# Patient Record
Sex: FEMALE | Race: WHITE | NOT HISPANIC OR LATINO | Employment: FULL TIME | URBAN - METROPOLITAN AREA
[De-identification: names, ages, dates, MRNs, and addresses within clinical notes are randomized per-mention and may not be internally consistent; named-entity substitution may affect disease eponyms.]

---

## 2017-01-24 ENCOUNTER — PREP FOR SURGERY (OUTPATIENT)
Dept: CARDIOLOGY | Facility: CLINIC | Age: 32
End: 2017-01-24

## 2017-01-24 DIAGNOSIS — I47.1 SVT (SUPRAVENTRICULAR TACHYCARDIA) (HCC): Primary | ICD-10-CM

## 2017-01-24 RX ORDER — ONDANSETRON 2 MG/ML
4 INJECTION INTRAMUSCULAR; INTRAVENOUS EVERY 6 HOURS PRN
Status: CANCELLED | OUTPATIENT
Start: 2017-01-24

## 2017-01-24 RX ORDER — OXYCODONE HYDROCHLORIDE AND ACETAMINOPHEN 5; 325 MG/1; MG/1
1 TABLET ORAL EVERY 4 HOURS PRN
Status: CANCELLED | OUTPATIENT
Start: 2017-01-24 | End: 2017-02-03

## 2017-01-24 RX ORDER — ALPRAZOLAM 0.25 MG/1
0.25 TABLET ORAL EVERY 6 HOURS PRN
Status: CANCELLED | OUTPATIENT
Start: 2017-01-24 | End: 2017-02-03

## 2017-01-24 RX ORDER — NITROGLYCERIN 0.4 MG/1
0.4 TABLET SUBLINGUAL
Status: CANCELLED | OUTPATIENT
Start: 2017-01-24

## 2017-01-24 RX ORDER — ACETAMINOPHEN 325 MG/1
650 TABLET ORAL EVERY 4 HOURS PRN
Status: CANCELLED | OUTPATIENT
Start: 2017-01-24

## 2017-02-01 ENCOUNTER — APPOINTMENT (OUTPATIENT)
Dept: PREADMISSION TESTING | Facility: HOSPITAL | Age: 32
End: 2017-02-01

## 2017-02-01 DIAGNOSIS — I47.1 SVT (SUPRAVENTRICULAR TACHYCARDIA) (HCC): ICD-10-CM

## 2017-02-01 LAB
ANION GAP SERPL CALCULATED.3IONS-SCNC: 4 MMOL/L (ref 3–11)
BUN BLD-MCNC: 16 MG/DL (ref 9–23)
BUN/CREAT SERPL: 26.7 (ref 7–25)
CALCIUM SPEC-SCNC: 9.8 MG/DL (ref 8.7–10.4)
CHLORIDE SERPL-SCNC: 104 MMOL/L (ref 99–109)
CO2 SERPL-SCNC: 31 MMOL/L (ref 20–31)
CREAT BLD-MCNC: 0.6 MG/DL (ref 0.6–1.3)
DEPRECATED RDW RBC AUTO: 40.2 FL (ref 37–54)
ERYTHROCYTE [DISTWIDTH] IN BLOOD BY AUTOMATED COUNT: 12.5 % (ref 11.3–14.5)
GFR SERPL CREATININE-BSD FRML MDRD: 117 ML/MIN/1.73
GLUCOSE BLD-MCNC: 113 MG/DL (ref 70–100)
HCG INTACT+B SERPL-ACNC: <5 MIU/ML
HCT VFR BLD AUTO: 40.3 % (ref 34.5–44)
HGB BLD-MCNC: 13.9 G/DL (ref 11.5–15.5)
INR PPP: 0.96
MAGNESIUM SERPL-MCNC: 1.9 MG/DL (ref 1.3–2.7)
MCH RBC QN AUTO: 30.2 PG (ref 27–31)
MCHC RBC AUTO-ENTMCNC: 34.5 G/DL (ref 32–36)
MCV RBC AUTO: 87.4 FL (ref 80–99)
PLATELET # BLD AUTO: 248 10*3/MM3 (ref 150–450)
PMV BLD AUTO: 9.2 FL (ref 6–12)
POTASSIUM BLD-SCNC: 4.1 MMOL/L (ref 3.5–5.5)
PROTHROMBIN TIME: 10.5 SECONDS (ref 9.6–11.5)
RBC # BLD AUTO: 4.61 10*6/MM3 (ref 3.89–5.14)
SODIUM BLD-SCNC: 139 MMOL/L (ref 132–146)
WBC NRBC COR # BLD: 6.03 10*3/MM3 (ref 3.5–10.8)

## 2017-02-01 RX ORDER — ASCORBIC ACID 500 MG
500 TABLET ORAL DAILY
COMMUNITY
End: 2017-05-16

## 2017-02-01 NOTE — DISCHARGE INSTRUCTIONS
The following instructions given during Pre Admission Testing visit:    Do not eat or drink anything after MN except for sips of water with your a.m. Prescription meds unless otherwise instructed by your physician.    Glasses and jewelry may be worn, but dentures must be removed prior to cath/procedure.    Leave any items you consider valuable at home.    Family members may wait in [facility] waiting area during procedure.    Bring all medications in their original containers the day of procedure.    Bring photo ID and insurance cards on the day of procedure.    Need to make arrangements for transportation prior to discharge.    The following handouts were given:     Heart Cath pathway (if applicable)   Cardiac Cath booklet published by [facility]    OR appropriate [facility] procedure booklet    If applicable, pt instructed to bring CPAP mask and tubing the day of procedure.

## 2017-02-02 ENCOUNTER — HOSPITAL ENCOUNTER (OUTPATIENT)
Facility: HOSPITAL | Age: 32
Setting detail: OBSERVATION
Discharge: HOME | End: 2017-02-03
Attending: INTERNAL MEDICINE | Admitting: INTERNAL MEDICINE

## 2017-02-02 DIAGNOSIS — I47.1 SVT (SUPRAVENTRICULAR TACHYCARDIA) (HCC): ICD-10-CM

## 2017-02-02 PROBLEM — I47.10 SVT (SUPRAVENTRICULAR TACHYCARDIA): Status: ACTIVE | Noted: 2017-02-02

## 2017-02-02 PROCEDURE — 93653 COMPRE EP EVAL TX SVT: CPT | Performed by: INTERNAL MEDICINE

## 2017-02-02 PROCEDURE — 25010000002 DIPHENHYDRAMINE PER 50 MG: Performed by: INTERNAL MEDICINE

## 2017-02-02 PROCEDURE — 93613 INTRACARDIAC EPHYS 3D MAPG: CPT | Performed by: INTERNAL MEDICINE

## 2017-02-02 PROCEDURE — C1732 CATH, EP, DIAG/ABL, 3D/VECT: HCPCS | Performed by: INTERNAL MEDICINE

## 2017-02-02 PROCEDURE — G0378 HOSPITAL OBSERVATION PER HR: HCPCS

## 2017-02-02 PROCEDURE — C1730 CATH, EP, 19 OR FEW ELECT: HCPCS | Performed by: INTERNAL MEDICINE

## 2017-02-02 PROCEDURE — 25010000002 MIDAZOLAM PER 1 MG: Performed by: INTERNAL MEDICINE

## 2017-02-02 PROCEDURE — 25010000002 FENTANYL CITRATE (PF) 100 MCG/2ML SOLUTION: Performed by: INTERNAL MEDICINE

## 2017-02-02 PROCEDURE — 93623 PRGRMD STIMJ&PACG IV RX NFS: CPT | Performed by: INTERNAL MEDICINE

## 2017-02-02 PROCEDURE — 25010000002 ONDANSETRON PER 1 MG: Performed by: INTERNAL MEDICINE

## 2017-02-02 PROCEDURE — C1894 INTRO/SHEATH, NON-LASER: HCPCS | Performed by: INTERNAL MEDICINE

## 2017-02-02 PROCEDURE — 25010000002 HEPARIN (PORCINE) PER 1000 UNITS: Performed by: INTERNAL MEDICINE

## 2017-02-02 PROCEDURE — 99152 MOD SED SAME PHYS/QHP 5/>YRS: CPT | Performed by: INTERNAL MEDICINE

## 2017-02-02 RX ORDER — HYDROCODONE BITARTRATE AND ACETAMINOPHEN 5; 325 MG/1; MG/1
1 TABLET ORAL EVERY 4 HOURS PRN
Status: DISCONTINUED | OUTPATIENT
Start: 2017-02-02 | End: 2017-02-03 | Stop reason: HOSPADM

## 2017-02-02 RX ORDER — ONDANSETRON 2 MG/ML
4 INJECTION INTRAMUSCULAR; INTRAVENOUS EVERY 6 HOURS PRN
Status: DISCONTINUED | OUTPATIENT
Start: 2017-02-02 | End: 2017-02-02 | Stop reason: HOSPADM

## 2017-02-02 RX ORDER — SODIUM CHLORIDE 0.9 % (FLUSH) 0.9 %
1-10 SYRINGE (ML) INJECTION AS NEEDED
Status: DISCONTINUED | OUTPATIENT
Start: 2017-02-02 | End: 2017-02-03 | Stop reason: HOSPADM

## 2017-02-02 RX ORDER — OXYCODONE HYDROCHLORIDE AND ACETAMINOPHEN 5; 325 MG/1; MG/1
1 TABLET ORAL EVERY 4 HOURS PRN
Status: DISCONTINUED | OUTPATIENT
Start: 2017-02-02 | End: 2017-02-02 | Stop reason: HOSPADM

## 2017-02-02 RX ORDER — FENTANYL CITRATE 50 UG/ML
INJECTION, SOLUTION INTRAMUSCULAR; INTRAVENOUS AS NEEDED
Status: DISCONTINUED | OUTPATIENT
Start: 2017-02-02 | End: 2017-02-02 | Stop reason: HOSPADM

## 2017-02-02 RX ORDER — ACETAMINOPHEN 325 MG/1
650 TABLET ORAL EVERY 4 HOURS PRN
Status: DISCONTINUED | OUTPATIENT
Start: 2017-02-02 | End: 2017-02-02 | Stop reason: HOSPADM

## 2017-02-02 RX ORDER — DIPHENHYDRAMINE HYDROCHLORIDE 50 MG/ML
INJECTION INTRAMUSCULAR; INTRAVENOUS AS NEEDED
Status: DISCONTINUED | OUTPATIENT
Start: 2017-02-02 | End: 2017-02-02 | Stop reason: HOSPADM

## 2017-02-02 RX ORDER — NORETHINDRONE ACETATE AND ETHINYL ESTRADIOL 1MG-20(21)
1 KIT ORAL DAILY
Status: DISCONTINUED | OUTPATIENT
Start: 2017-02-02 | End: 2017-02-03 | Stop reason: HOSPADM

## 2017-02-02 RX ORDER — SODIUM CHLORIDE 9 MG/ML
INJECTION, SOLUTION INTRAVENOUS CONTINUOUS PRN
Status: DISCONTINUED | OUTPATIENT
Start: 2017-02-02 | End: 2017-02-02 | Stop reason: HOSPADM

## 2017-02-02 RX ORDER — ONDANSETRON 2 MG/ML
INJECTION INTRAMUSCULAR; INTRAVENOUS AS NEEDED
Status: DISCONTINUED | OUTPATIENT
Start: 2017-02-02 | End: 2017-02-02 | Stop reason: HOSPADM

## 2017-02-02 RX ORDER — ALPRAZOLAM 0.25 MG/1
0.25 TABLET ORAL EVERY 6 HOURS PRN
Status: DISCONTINUED | OUTPATIENT
Start: 2017-02-02 | End: 2017-02-02 | Stop reason: HOSPADM

## 2017-02-02 RX ORDER — ONDANSETRON 2 MG/ML
4 INJECTION INTRAMUSCULAR; INTRAVENOUS EVERY 6 HOURS PRN
Status: DISCONTINUED | OUTPATIENT
Start: 2017-02-02 | End: 2017-02-03 | Stop reason: HOSPADM

## 2017-02-02 RX ORDER — VENLAFAXINE HYDROCHLORIDE 75 MG/1
75 CAPSULE, EXTENDED RELEASE ORAL DAILY
Status: DISCONTINUED | OUTPATIENT
Start: 2017-02-02 | End: 2017-02-03 | Stop reason: HOSPADM

## 2017-02-02 RX ORDER — ACETAMINOPHEN 325 MG/1
650 TABLET ORAL EVERY 6 HOURS PRN
Status: DISCONTINUED | OUTPATIENT
Start: 2017-02-02 | End: 2017-02-03 | Stop reason: HOSPADM

## 2017-02-02 RX ORDER — NITROGLYCERIN 0.4 MG/1
0.4 TABLET SUBLINGUAL
Status: DISCONTINUED | OUTPATIENT
Start: 2017-02-02 | End: 2017-02-02 | Stop reason: HOSPADM

## 2017-02-02 RX ORDER — MIDAZOLAM HYDROCHLORIDE 1 MG/ML
INJECTION INTRAMUSCULAR; INTRAVENOUS AS NEEDED
Status: DISCONTINUED | OUTPATIENT
Start: 2017-02-02 | End: 2017-02-02 | Stop reason: HOSPADM

## 2017-02-02 RX ADMIN — ACETAMINOPHEN 650 MG: 325 TABLET, FILM COATED ORAL at 19:30

## 2017-02-02 NOTE — PLAN OF CARE
Problem: Patient Care Overview (Adult)  Goal: Plan of Care Review  Outcome: Ongoing (interventions implemented as appropriate)    02/02/17 2837   Coping/Psychosocial Response Interventions   Plan Of Care Reviewed With patient;family   Outcome Evaluation   Outcome Summary/Follow up Plan Pt arrived to floor VSS. Right groin and IJ site clean, dry and intact. Bedrest up at 1930. Normal sinus on monitor. No complaints at this time.         Problem: Arrhythmia/Dysrhythmia (Symptomatic) (Adult)  Goal: Signs and Symptoms of Listed Potential Problems Will be Absent or Manageable (Arrhythmia/Dysrhythmia)  Outcome: Ongoing (interventions implemented as appropriate)

## 2017-02-02 NOTE — H&P
Cardiology History and Physical     Gisella Mora  1985  [phone]    02/02/17      [facility]    Herber Erickson MD      CC: SVT     Problem List:  1. Supraventricular tachycardia:   a. Initial episode in 2012; patient had sustained tachypalpitations lasting approximately 3 hours with rates up to the 180s. Episodes were documented on EMS telemetry by a friend. Episode terminated prior to her arrival at the Emergency Department.   b. Between 2012 and 2015, continued episodes on a monthly basis, able to be terminated by vagal maneuvers.       History of Present Illness:   Pt presents for follow up of EPS and possible ablation of SVT . She is having episodes 1-2 times/ month. They can last up to one hour (at which point she will feel SOB, weak) but she can usually get it to stop with vagal maneuvers in about 15-30 minutes.The most she has had in 1 month is 3. The longest she has gone without an episode is 3 months. She says the longest episode was the initial 1 which lasted approximately 3 hours. The other episodes are anywhere between 5 and 30 minutes and can be terminated with vagal maneuvers. She has never had an Emergency Department visit secondary to her SVT. She has never passed out when she is out of rhythm. She feels sustained tachypalpitations as well as numbness and tingling in her hands and arms. She has not tried any medications for the arrhythmia.  Since the pt has seen us in clinic last, pt denies any syncope. She denies any hospitalizations, ER visits. She walks/ jogs 4 days per week for 30 minutes and feels well.     No Known Allergies    Prior to Admission Medications     Prescriptions Last Dose Informant Patient Reported? Taking?    MICROGESTIN FE 1/20 1-20 MG-MCG per tablet  Self Yes No    Take 1 tablet by mouth Daily.    Phenyleph-Doxylamine-DM-APAP (DIMITRY-SELTZER PLUS DAY/NIGHT PO)  Self Yes No    Take 1 tablet by mouth 2 (Two) Times a Day.    venlafaxine XR  (EFFEXOR-XR) 75 MG 24 hr capsule  Self Yes No    Take 75 mg by mouth Daily.    vitamin C (ASCORBIC ACID) 500 MG tablet  Self Yes No    Take 500 mg by mouth Daily.            Current Facility-Administered Medications:   •  acetaminophen (TYLENOL) tablet 650 mg, 650 mg, Oral, Q4H PRN, YEN Roach  •  ALPRAZolam (XANAX) tablet 0.25 mg, 0.25 mg, Oral, Q6H PRN, YEN Roach  •  nitroglycerin (NITROSTAT) SL tablet 0.4 mg, 0.4 mg, Sublingual, Q5 Min PRN, YEN Roach  •  ondansetron (ZOFRAN) injection 4 mg, 4 mg, Intravenous, Q6H PRN, YEN Roach  •  oxyCODONE-acetaminophen (PERCOCET) 5-325 MG per tablet 1 tablet, 1 tablet, Oral, Q4H PRN, YEN Roach    Social History     Social History   • Marital status:      Spouse name: N/A   • Number of children: N/A   • Years of education: N/A     Social History Main Topics   • Smoking status: Former Smoker     Years: 3.00     Types: Cigarettes     Quit date: 01/2008   • Smokeless tobacco: Never Used   • Alcohol use No   • Drug use: No   • Sexual activity: Defer     Other Topics Concern   • None     Social History Narrative       Family History: Negative for arrhythmia or premature CAD    REVIEW OF SYSTEMS:   CONST:  No weight loss, fever, chills, weakness or fatigue. + Congestion sinuses  HEENT:  No visual loss, blurred vision, double vision, yellow sclerae.                   No hearing loss, congestion, sore throat.   SKIN:      No rashes, urticaria, ulcers, sores.     RESP:     No shortness of breath, hemoptysis, cough, sputum.   GI:           No anorexia, nausea, vomiting, diarrhea. No abdominal pain, melena.   :         No burning on urination, hematuria or increased frequency.  ENDO:    No diaphoresis, cold or heat intolerance. No polyuria or polydipsia.   NEURO:  No headache, dizziness, syncope, paralysis, ataxia, or parasthesias.                  No change in bowel or bladder control. No history of CVA/TIA  MUSC:    No muscle,  "back pain, joint pain or stiffness.   HEME:    No anemia, bleeding, bruising. No history of DVT/PE.  PSYCH:  No history of depression, anxiety    Vitals:    02/02/17 1030 02/02/17 1032   BP: 118/61 112/71   BP Location: Right arm Left arm   Patient Position: Lying Lying   Pulse: 71    Resp: 18    Temp: 97.4 °F (36.3 °C)    TempSrc: Tympanic    SpO2: 99%    Weight: 163 lb 2.3 oz (74 kg)    Height: 67\" (170.2 cm)        Physical Exam:  GEN: Well nourished, Well- developed  No acute distress  HEENT: Normocephalic, Atraumatic, PERRLA, moist mucous membranes  NECK: supple, NO JVD, no thyromegaly, no lymphadenopathy  CARD: S1S2  RRR no murmur, gallop, rub  LUNGS: Clear to auscultataion, normal respiratory effort  ABDOMEN: Soft, nontender, normal bowel sounds  EXTREMITIES:No gross deformities,  No clubbing, cyanosis, or edema  SKIN: Warm, dry  NEURO: No focal deficits  PSYCHIATRIC: Normal affect and mood      Data:     Results from last 7 days  Lab Units 02/01/17  1300   WBC 10*3/mm3 6.03   HEMOGLOBIN g/dL 13.9   HEMATOCRIT % 40.3   PLATELETS 10*3/mm3 248       Results from last 7 days  Lab Units 02/01/17  1300   SODIUM mmol/L 139   POTASSIUM mmol/L 4.1   CHLORIDE mmol/L 104   TOTAL CO2 mmol/L 31.0   BUN mg/dL 16   CREATININE mg/dL 0.60   GLUCOSE mg/dL 113*      Lab Results   Component Value Date    HCGQUANT <5.00 02/01/2017                       Results from last 7 days  Lab Units 02/01/17  1300   PROTIME Seconds 10.5   INR  0.96           Tele: NSR      Assessment and Plan:   1. SVT (supraventricular tachycardia)      The patient will undergo EPS +- RFA of SVT. The risks, benefits, and alternatives of the procedure have been reviewed and the patient wishes to proceed.     [facility]  2/2/2017   10:57 AM      Joby MERCADO MD, personally performed the services described in this documentation as scribed by the above named individual in my presence, and it is both accurate " and complete.  2/2/2017  2:15 PM

## 2017-02-03 VITALS
DIASTOLIC BLOOD PRESSURE: 61 MMHG | SYSTOLIC BLOOD PRESSURE: 117 MMHG | WEIGHT: 163.14 LBS | OXYGEN SATURATION: 93 % | TEMPERATURE: 97.9 F | HEART RATE: 68 BPM | RESPIRATION RATE: 18 BRPM | BODY MASS INDEX: 25.61 KG/M2 | HEIGHT: 67 IN

## 2017-02-03 PROCEDURE — 99217 PR OBSERVATION CARE DISCHARGE MANAGEMENT: CPT | Performed by: INTERNAL MEDICINE

## 2017-02-03 PROCEDURE — 93010 ELECTROCARDIOGRAM REPORT: CPT | Performed by: INTERNAL MEDICINE

## 2017-02-03 PROCEDURE — 93005 ELECTROCARDIOGRAM TRACING: CPT | Performed by: INTERNAL MEDICINE

## 2017-02-03 PROCEDURE — G0378 HOSPITAL OBSERVATION PER HR: HCPCS

## 2017-02-03 RX ADMIN — VENLAFAXINE HYDROCHLORIDE 75 MG: 75 CAPSULE, EXTENDED RELEASE ORAL at 09:00

## 2017-02-03 RX ADMIN — NORETHINDRONE ACETATE AND ETHINYL ESTRADIOL 1 TABLET: KIT at 09:15

## 2017-02-03 NOTE — PROGRESS NOTES
"[facility]  Progress Note     LOS: 1 day   Patient Care Team:  Herber Erickson MD as PCP - General (Family Medicine)    Chief Complaint:  Follow up SVT    Subjective     Feels fine. No CP or SOB. Felt a little flutter a couple of times last night. No SVT.  Overall doing well once ago home at this time.        Review of Systems:   Pertinent positives in HPI, all others reviewed and negative.      Objective       Current Facility-Administered Medications:   •  acetaminophen (TYLENOL) tablet 650 mg, 650 mg, Oral, Q6H PRN, Joby Fuentes MD, 650 mg at 02/02/17 1930  •  HYDROcodone-acetaminophen (NORCO) 5-325 MG per tablet 1 tablet, 1 tablet, Oral, Q4H PRN, Joby Fuentes MD  •  norethindrone-ethinyl estradiol (JUNEL FE 1/20) 1-20 MG-MCG per tablet 1 tablet, 1 tablet, Oral, Daily, ROYER Shannon, 1 tablet at 02/02/17 1626  •  ondansetron (ZOFRAN) injection 4 mg, 4 mg, Intravenous, Q6H PRN, Joby Fuentes MD  •  sodium chloride 0.9 % flush 1-10 mL, 1-10 mL, Intravenous, PRN, Joby Fuentes MD  •  venlafaxine XR (EFFEXOR-XR) 24 hr capsule 75 mg, 75 mg, Oral, Daily, ROYER Shannon, 75 mg at 02/02/17 1626    Vital Sign Min/Max for last 24 hours  Temp  Min: 97.4 °F (36.3 °C)  Max: 98.3 °F (36.8 °C)   BP  Min: 105/66  Max: 140/68   Pulse  Min: 58  Max: 110   Resp  Min: 8  Max: 20   SpO2  Min: 93 %  Max: 100 %   Flow (L/min)  Min: 2  Max: 2   Weight  Min: 163 lb 2.3 oz (74 kg)  Max: 163 lb 2.3 oz (74 kg)     Flowsheet Rows         First Filed Value    Admission Height  67\" (170.2 cm) Documented at 02/02/2017 1030    Admission Weight  163 lb 2.3 oz (74 kg) Documented at 02/02/2017 1030          Physical Exam:     General Appearance:    Alert, cooperative, in no acute distress   Lungs:     clear to auscultation bilaterally.      Heart:    rate and rhythm normal S1 and S2 no S3 or S4 or murmurs    Chest Wall:    No abnormalities observed   Abdomen:     Normal bowel sounds, " no masses,  soft  non-tender, non-distended, no guarding, no rebound tenderness   Extremities:   Moves all extremities well, no edema, no cyanosis, no             redness   Pulses:   Pulses palpable and equal bilaterally   Skin:   Groin sites are clean, dry and intact. No redness, swelling or drainage.  Vascular sites are normal         Results Review:     Results from last 7 days  Lab Units 02/01/17  1300   WBC 10*3/mm3 6.03   HEMOGLOBIN g/dL 13.9   HEMATOCRIT % 40.3   PLATELETS 10*3/mm3 248       Results from last 7 days  Lab Units 02/01/17  1300   SODIUM mmol/L 139   POTASSIUM mmol/L 4.1   CHLORIDE mmol/L 104   TOTAL CO2 mmol/L 31.0   BUN mg/dL 16   CREATININE mg/dL 0.60   GLUCOSE mg/dL 113*                        Results from last 7 days  Lab Units 02/01/17  1300   PROTIME Seconds 10.5   INR  0.96           Intake/Output Summary (Last 24 hours) at 02/03/17 0739  Last data filed at 02/03/17 0500   Gross per 24 hour   Intake   1205 ml   Output    750 ml   Net    455 ml           EKG: Accelerated junctional rhythm with isorhythmic A-V dissociation, normal sinus rhythm with NC interval measured at 140 ms, PA-C    Telemetry: NSR, normal NC interval, accelerated junctional rhythm, rare PACs      Present on Admission:  • SVT (supraventricular tachycardia)    Assessment/Plan     1. SVT: s/p AVNRT ablation. Pt has done well over night. Medications, wound care and follow have been reviewed with the patient.  No significant SVT overnight.  Accelerated junctional rhythm and isorhythmic A-V dissociation noted.  Normal NC present when sinus rate greater than junctional rate.      Plan for disposition:The patient is stable and will be discharged to home  Today with follow up with Dr. Fuentes in 3 months.  ROYER Ngo  02/03/17  7:39 AM      I, Joby Fuentes MD, personally performed the services described as documented by the above named individual. I have made any necessary edits and it is both accurate and  complete 2/3/2017  10:14 AM

## 2017-02-03 NOTE — PLAN OF CARE
Problem: Patient Care Overview (Adult)  Goal: Plan of Care Review  Outcome: Outcome(s) achieved Date Met:  02/03/17 02/03/17 1035   Coping/Psychosocial Response Interventions   Plan Of Care Reviewed With patient;mother   Outcome Evaluation   Outcome Summary/Follow up Plan Pt has been in NSR with occasional accelerated junctional rhythm. MD is aware and is wanting to F/U outpatient. Sites are clean, dry with no hematoma. D/C home today.    Patient Care Overview   Progress improving       Goal: Adult Individualization and Mutuality  Outcome: Outcome(s) achieved Date Met:  02/03/17  Goal: Discharge Needs Assessment  Outcome: Outcome(s) achieved Date Met:  02/03/17 02/03/17 0943   Discharge Needs Assessment   Concerns To Be Addressed no discharge needs identified   Equipment Needed After Discharge none   Current Health   Anticipated Changes Related to Illness none   Living Environment   Transportation Available car;family or friend will provide   Self-Care   Equipment Currently Used at Home none         Problem: Arrhythmia/Dysrhythmia (Symptomatic) (Adult)  Goal: Signs and Symptoms of Listed Potential Problems Will be Absent or Manageable (Arrhythmia/Dysrhythmia)  Outcome: Outcome(s) achieved Date Met:  02/03/17 02/03/17 1035   Arrhythmia/Dysrhythmia (Symptomatic)   Problems Assessed (Arrhythmia/Dysrhythmia) all   Problems Present (Arrhythmia/Dysrhythmia) none

## 2017-02-03 NOTE — PROGRESS NOTES
Discharge Planning Assessment  [facility]     Patient Name: Gisella Mora  MRN: [id number]  Today's Date: 2/3/2017    Admit Date: 2/2/2017          Discharge Needs Assessment       02/03/17 0943    Living Environment    Lives With spouse;child(kranthi), dependent    Living Arrangements house   Lives in [facility] [place] [facility]    Provides Primary Care For child(kranthi)    Quality Of Family Relationships supportive;helpful;involved    Able to Return to Prior Living Arrangements yes    Discharge Needs Assessment    Concerns To Be Addressed no discharge needs identified    Anticipated Changes Related to Illness none    Equipment Currently Used at Home none    Equipment Needed After Discharge none    Transportation Available car;family or friend will provide    Discharge Contact Information if Applicable [phone]            Discharge Plan       02/03/17 0967    Case Management/Social Work Plan    Plan Home    Patient/Family In Agreement With Plan yes    Additional Comments Pt lives in [facility] [place] [facility]. Is independent of ADL's. No DME or HH. D/C plan is to return home with spouse and dependant children. CM will  continue to follow.         Discharge Placement     No information found        Expected Discharge Date and Time     Expected Discharge Date Expected Discharge Time    Feb 3, 2017               Demographic Summary       02/03/17 0942    Contact Information    Permission Granted to Share Information With     Primary Care Physician Information    Name Dr. Herber Erickson            Functional Status       02/03/17 0943    Functional Status Prior    Ambulation 0-->independent    Transferring 0-->independent    Toileting 0-->independent    Bathing 0-->independent    Dressing 0-->independent    Eating 0-->independent    Communication 0-->understands/communicates without difficulty    Swallowing 0-->swallows foods/liquids without difficulty    IADL    Medications independent    Meal Preparation independent    Housekeeping  independent    Laundry independent    Shopping independent    Oral Care independent    Activity Tolerance    Current Activity Limitations none    Usual Activity Tolerance excellent    Current Activity Tolerance excellent            Psychosocial     None            Abuse/Neglect     None            Legal     None            Substance Abuse     None            Patient Forms     None          America Storey

## 2017-02-03 NOTE — DISCHARGE SUMMARY
2/2/2017  2/3/2017    Herber Erickson MD  [unfilled]    [facility] MC Fuentes MD  [address] [facility] [address] [place] [address]        Discharge Problem List:  1.  Supraventricular tachycardia:        A. Initial episode in 2012; patient had sustained tachypalpitations lasting approximately 3 hours with rates up to the 180s. Episodes were documented on EMS telemetry by a friend. Episode terminated prior to her arrival at the Emergency Department.         B. Between 2012 and 2015, continued episodes on a monthly basis, able to be terminated by vagal maneuvers.        C. 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 She is having episodes 1-2 times/ month. They can last up to one hour (at which point she will feel SOB, weak) but she can usually get it to stop with vagal maneuvers in about 15-30 minutes.The most she has had in 1 month is 3. The longest she has gone without an episode is 3 months. She says the longest episode was the initial 1 which lasted approximately 3 hours. The other episodes are anywhere between 5 and 30 minutes and can be terminated with vagal maneuvers. She has never had an Emergency Department visit secondary to her SVT. She has never passed out when she is out of rhythm. She feels sustained tachypalpitations as well as numbness and tingling in her hands and arms. She has not tried any medications for the arrhythmia.  Since the pt has seen us in clinic last, pt denies any syncope. She denies any hospitalizations, ER visits. She walks/ jogs 4 days per week for 30 minutes and feels well.     Hospital Course:   The patient underwent EPS with ablation of AVNRT of the common type with Dr. Fuentes on the day of admission. There were no complications. She was monitored on telemetry overnight. She did at times have some junctional rhythm but this was felt to be due to high vagal tone. She was asymptomatic with this. She did well  overnight, and was in stable condition in the morning. She was discharged home with plan to follow with Dr. Fuentes in 12 weeks.     Vitals:    02/03/17 0906   BP: 117/61   Pulse: 68   Resp: 18   Temp: 97.9 °F (36.6 °C)   SpO2:        Physical Exam:  General: Patient is alert, oriented, in no acute distress  Heart: RRR, S1S2 no murmur, rub or gallop  Lungs:Clear to auscultation bilaterally, normal respiratory effort  Ext: No clubbing, cyanosis, or LE edema  Neurologic: No focal deficits      Lab Results   Component Value Date    WBC 6.03 02/01/2017    HGB 13.9 02/01/2017    HCT 40.3 02/01/2017    MCV 87.4 02/01/2017     02/01/2017     Lab Results   Component Value Date    GLUCOSE 113 (H) 02/01/2017    CALCIUM 9.8 02/01/2017     02/01/2017    K 4.1 02/01/2017    CO2 31.0 02/01/2017     02/01/2017    BUN 16 02/01/2017    CREATININE 0.60 02/01/2017    EGFRIFNONA 117 02/01/2017    BCR 26.7 (H) 02/01/2017    ANIONGAP 4.0 02/01/2017     Lab Results   Component Value Date    INR 0.96 02/01/2017    PROTIME 10.5 02/01/2017       Data:      Gisella Mora EVGENY   Home Medication Instructions RYAN:[id number]    Printed on:02/03/17 3716   Medication Information                      MICROGESTIN FE 1/20 1-20 MG-MCG per tablet  Take 1 tablet by mouth Daily.             Phenyleph-Doxylamine-DM-APAP (DIMITRY-SELTZER PLUS DAY/NIGHT PO)  Take 1 tablet by mouth 2 (Two) Times a Day.             venlafaxine XR (EFFEXOR-XR) 75 MG 24 hr capsule  Take 75 mg by mouth Daily.             vitamin C (ASCORBIC ACID) 500 MG tablet  Take 500 mg by mouth Daily.                   Follow up with Dr. Fuentes in 12 weeks.    [facility]  2/3/2017   3:54 PM

## 2017-02-03 NOTE — PLAN OF CARE
Problem: Patient Care Overview (Adult)  Goal: Plan of Care Review  Outcome: Ongoing (interventions implemented as appropriate)    02/03/17 0439   Coping/Psychosocial Response Interventions   Plan Of Care Reviewed With patient;mother   Outcome Evaluation   Outcome Summary/Follow up Plan sites clean and dry no hematoma sinus rhythm room air ambulating without complications hoping to go home this am   Patient Care Overview   Progress progress toward functional goals as expected

## 2017-05-16 ENCOUNTER — OFFICE VISIT (OUTPATIENT)
Dept: CARDIOLOGY | Facility: CLINIC | Age: 32
End: 2017-05-16

## 2017-05-16 VITALS
SYSTOLIC BLOOD PRESSURE: 102 MMHG | WEIGHT: 162.4 LBS | HEART RATE: 57 BPM | BODY MASS INDEX: 24.61 KG/M2 | DIASTOLIC BLOOD PRESSURE: 64 MMHG | HEIGHT: 68 IN

## 2017-05-16 DIAGNOSIS — I47.1 SUPRAVENTRICULAR TACHYCARDIA (HCC): Primary | ICD-10-CM

## 2017-05-16 PROCEDURE — 99213 OFFICE O/P EST LOW 20 MIN: CPT | Performed by: INTERNAL MEDICINE

## 2017-05-16 PROCEDURE — 93000 ELECTROCARDIOGRAM COMPLETE: CPT | Performed by: INTERNAL MEDICINE

## 2017-05-16 RX ORDER — SERTRALINE HYDROCHLORIDE 100 MG/1
TABLET, FILM COATED ORAL DAILY
COMMUNITY
Start: 2017-05-10 | End: 2021-09-09

## 2021-09-09 ENCOUNTER — OFFICE VISIT (OUTPATIENT)
Dept: FAMILY MEDICINE CLINIC | Facility: CLINIC | Age: 36
End: 2021-09-09

## 2021-09-09 VITALS
RESPIRATION RATE: 18 BRPM | WEIGHT: 179 LBS | HEART RATE: 72 BPM | DIASTOLIC BLOOD PRESSURE: 78 MMHG | SYSTOLIC BLOOD PRESSURE: 110 MMHG | TEMPERATURE: 98.9 F | BODY MASS INDEX: 28.09 KG/M2 | OXYGEN SATURATION: 100 % | HEIGHT: 67 IN

## 2021-09-09 DIAGNOSIS — R14.0 FLATULENCE/GAS PAIN/BELCHING: ICD-10-CM

## 2021-09-09 DIAGNOSIS — Z13.220 SCREENING, LIPID: ICD-10-CM

## 2021-09-09 DIAGNOSIS — E04.9 ENLARGEMENT OF THYROID: ICD-10-CM

## 2021-09-09 DIAGNOSIS — K59.09 OTHER CONSTIPATION: ICD-10-CM

## 2021-09-09 DIAGNOSIS — L73.2 HIDRADENITIS SUPPURATIVA: Primary | ICD-10-CM

## 2021-09-09 PROCEDURE — 99204 OFFICE O/P NEW MOD 45 MIN: CPT | Performed by: NURSE PRACTITIONER

## 2021-09-09 RX ORDER — SULFAMETHOXAZOLE AND TRIMETHOPRIM 800; 160 MG/1; MG/1
1 TABLET ORAL 2 TIMES DAILY
Qty: 20 TABLET | Refills: 0 | Status: SHIPPED | OUTPATIENT
Start: 2021-09-09 | End: 2021-11-04

## 2021-09-09 RX ORDER — LISDEXAMFETAMINE DIMESYLATE 50 MG
50 CAPSULE ORAL EVERY MORNING
COMMUNITY
Start: 2021-09-02 | End: 2022-12-02

## 2021-09-09 RX ORDER — DULOXETIN HYDROCHLORIDE 60 MG/1
60 CAPSULE, DELAYED RELEASE ORAL 2 TIMES DAILY
COMMUNITY
Start: 2021-08-25 | End: 2022-12-02

## 2021-09-10 LAB — UREA BREATH TEST QL: NEGATIVE

## 2021-09-17 LAB
ALBUMIN SERPL-MCNC: 4.7 G/DL (ref 3.8–4.8)
ALBUMIN/GLOB SERPL: 1.7 {RATIO} (ref 1.2–2.2)
ALP SERPL-CCNC: 72 IU/L (ref 48–121)
ALT SERPL-CCNC: 23 IU/L (ref 0–32)
AST SERPL-CCNC: 16 IU/L (ref 0–40)
BASOPHILS # BLD AUTO: 0 X10E3/UL (ref 0–0.2)
BASOPHILS NFR BLD AUTO: 1 %
BILIRUB SERPL-MCNC: 0.3 MG/DL (ref 0–1.2)
BUN SERPL-MCNC: 10 MG/DL (ref 6–20)
BUN/CREAT SERPL: 16 (ref 9–23)
CALCIUM SERPL-MCNC: 9.7 MG/DL (ref 8.7–10.2)
CHLORIDE SERPL-SCNC: 101 MMOL/L (ref 96–106)
CHOLEST SERPL-MCNC: 149 MG/DL (ref 100–199)
CO2 SERPL-SCNC: 26 MMOL/L (ref 20–29)
CREAT SERPL-MCNC: 0.64 MG/DL (ref 0.57–1)
EOSINOPHIL # BLD AUTO: 0.1 X10E3/UL (ref 0–0.4)
EOSINOPHIL NFR BLD AUTO: 1 %
ERYTHROCYTE [DISTWIDTH] IN BLOOD BY AUTOMATED COUNT: 13.1 % (ref 11.7–15.4)
FT4I SERPL CALC-MCNC: 1.4 (ref 1.2–4.9)
GLOBULIN SER CALC-MCNC: 2.8 G/DL (ref 1.5–4.5)
GLUCOSE SERPL-MCNC: 83 MG/DL (ref 65–99)
HCT VFR BLD AUTO: 41.5 % (ref 34–46.6)
HDLC SERPL-MCNC: 53 MG/DL
HGB BLD-MCNC: 13.5 G/DL (ref 11.1–15.9)
IMM GRANULOCYTES # BLD AUTO: 0 X10E3/UL (ref 0–0.1)
IMM GRANULOCYTES NFR BLD AUTO: 0 %
LDLC SERPL CALC-MCNC: 80 MG/DL (ref 0–99)
LYMPHOCYTES # BLD AUTO: 1.5 X10E3/UL (ref 0.7–3.1)
LYMPHOCYTES NFR BLD AUTO: 27 %
MAGNESIUM RBC-MCNC: 4.2 MG/DL (ref 4.2–6.8)
MCH RBC QN AUTO: 27.3 PG (ref 26.6–33)
MCHC RBC AUTO-ENTMCNC: 32.5 G/DL (ref 31.5–35.7)
MCV RBC AUTO: 84 FL (ref 79–97)
MONOCYTES # BLD AUTO: 0.5 X10E3/UL (ref 0.1–0.9)
MONOCYTES NFR BLD AUTO: 9 %
NEUTROPHILS # BLD AUTO: 3.6 X10E3/UL (ref 1.4–7)
NEUTROPHILS NFR BLD AUTO: 62 %
PLATELET # BLD AUTO: 274 X10E3/UL (ref 150–450)
POTASSIUM SERPL-SCNC: 4.4 MMOL/L (ref 3.5–5.2)
PROT SERPL-MCNC: 7.5 G/DL (ref 6–8.5)
RBC # BLD AUTO: 4.94 X10E6/UL (ref 3.77–5.28)
SODIUM SERPL-SCNC: 139 MMOL/L (ref 134–144)
T3RU NFR SERPL: 25 % (ref 24–39)
T4 SERPL-MCNC: 5.6 UG/DL (ref 4.5–12)
THYROPEROXIDASE AB SERPL-ACNC: <8 IU/ML (ref 0–34)
TRIGL SERPL-MCNC: 82 MG/DL (ref 0–149)
TSH SERPL DL<=0.005 MIU/L-ACNC: 1.18 UIU/ML (ref 0.45–4.5)
VLDLC SERPL CALC-MCNC: 16 MG/DL (ref 5–40)
WBC # BLD AUTO: 5.7 X10E3/UL (ref 3.4–10.8)

## 2021-11-04 ENCOUNTER — OFFICE VISIT (OUTPATIENT)
Dept: FAMILY MEDICINE CLINIC | Facility: CLINIC | Age: 36
End: 2021-11-04

## 2021-11-04 VITALS
SYSTOLIC BLOOD PRESSURE: 120 MMHG | TEMPERATURE: 98.2 F | BODY MASS INDEX: 28.03 KG/M2 | OXYGEN SATURATION: 99 % | HEIGHT: 67 IN | WEIGHT: 178.6 LBS | DIASTOLIC BLOOD PRESSURE: 70 MMHG | RESPIRATION RATE: 20 BRPM | HEART RATE: 80 BPM

## 2021-11-04 DIAGNOSIS — R10.84 GENERALIZED ABDOMINAL PAIN: ICD-10-CM

## 2021-11-04 DIAGNOSIS — R10.30 LOWER ABDOMINAL PAIN: Primary | ICD-10-CM

## 2021-11-04 LAB
B-HCG UR QL: NEGATIVE
BILIRUB BLD-MCNC: NEGATIVE MG/DL
CLARITY, POC: CLEAR
COLOR UR: YELLOW
EXPIRATION DATE: NORMAL
EXPIRATION DATE: NORMAL
GLUCOSE UR STRIP-MCNC: NEGATIVE MG/DL
INTERNAL NEGATIVE CONTROL: NORMAL
INTERNAL POSITIVE CONTROL: NORMAL
KETONES UR QL: NEGATIVE
LEUKOCYTE EST, POC: NEGATIVE
Lab: NORMAL
Lab: NORMAL
NITRITE UR-MCNC: NEGATIVE MG/ML
PH UR: 6 [PH] (ref 5–8)
PROT UR STRIP-MCNC: NEGATIVE MG/DL
RBC # UR STRIP: NEGATIVE /UL
SP GR UR: 1.01 (ref 1–1.03)
UROBILINOGEN UR QL: NORMAL

## 2021-11-04 PROCEDURE — 81025 URINE PREGNANCY TEST: CPT | Performed by: NURSE PRACTITIONER

## 2021-11-04 PROCEDURE — 81003 URINALYSIS AUTO W/O SCOPE: CPT | Performed by: NURSE PRACTITIONER

## 2021-11-04 PROCEDURE — 99213 OFFICE O/P EST LOW 20 MIN: CPT | Performed by: NURSE PRACTITIONER

## 2021-11-04 NOTE — PROGRESS NOTES
"Chief Complaint  abdominal pain & tenderness (started on Monday )    Subjective          Gisella Mora presents to [facility]  History of Present Illness   Generalized Abdominal pain for 3-4 days  Abdominal pain that started on Monday after eating (ate cheese burger at school cafeteria)  [place] like someone punched her in the stomach, or squeezing her abdomen tightly for several days, then eased up   Back pain and up under shoulder blades hurt bilaterally  Regular bowel movement, no belching or burping, some nausea at the time of the most intense pain on Monday but no vomiting or diarrhea, no fever or chills or change in appetite. No sweats or dizziness or weakness.  No dysuria or vaginal discharge, no RUQ abdominal pain no low back pain, has regular menstrual cycle   This same symptoms has happened several times to her in the past year, having a lot of belly issues, tested for H pylori but it was negative.  Feeling much Better today  Taking Pepto bismol and antiacid but did not really help     Did have COVID back in July loss of taste and smell but hat has returned to normal  Not on OCP    Objective   Vital Signs:   /70   Pulse 80   Temp 98.2 °F (36.8 °C)   Resp 20   Ht 170.2 cm (67.01\")   Wt 81 kg (178 lb 9.6 oz)   SpO2 99%   BMI 27.97 kg/m²     Physical Exam  Vitals and nursing note reviewed.   Constitutional:       General: She is not in acute distress.     Appearance: Normal appearance. She is well-developed. She is not toxic-appearing or diaphoretic.   HENT:      Head: Normocephalic.      Nose: Nose normal.   Cardiovascular:      Rate and Rhythm: Normal rate and regular rhythm.      Pulses: Normal pulses.      Heart sounds: Normal heart sounds.   Pulmonary:      Effort: Pulmonary effort is normal.      Breath sounds: Normal breath sounds.   Abdominal:      General: Bowel sounds are normal. There is no distension.      Palpations: Abdomen is soft. There is no mass. "      Tenderness: There is abdominal tenderness. There is no right CVA tenderness, left CVA tenderness, guarding or rebound.      Hernia: No hernia is present.   Skin:     General: Skin is warm and dry.   Neurological:      Mental Status: She is alert and oriented to person, place, and time.   Psychiatric:         Mood and Affect: Mood normal.         Behavior: Behavior normal.         Thought Content: Thought content normal.         Judgment: Judgment normal.        Result Review :                 Assessment and Plan    Diagnoses and all orders for this visit:    1. Lower abdominal pain (Primary)  -     POCT pregnancy, urine  -     POCT urinalysis dipstick, automated    2. Generalized abdominal pain  -     POCT urinalysis dipstick, automated  -     POCT pregnancy, urine        Follow Up   No follow-ups on file.  Patient was given instructions and counseling regarding her condition or for health maintenance advice. Please see specific information pulled into the AVS if appropriate.   UA and urine pregnancy negative for abnormality; pt informed  Will hold on getting CT scan of abdomen and pelvis or US of abdomen since pt is feeling much better, but will follow up if symptoms return or worsening will go to ER

## 2022-03-04 ENCOUNTER — OFFICE VISIT (OUTPATIENT)
Dept: FAMILY MEDICINE CLINIC | Facility: CLINIC | Age: 37
End: 2022-03-04

## 2022-03-04 VITALS
HEIGHT: 67 IN | TEMPERATURE: 97.6 F | DIASTOLIC BLOOD PRESSURE: 60 MMHG | HEART RATE: 80 BPM | RESPIRATION RATE: 20 BRPM | SYSTOLIC BLOOD PRESSURE: 120 MMHG | WEIGHT: 189.5 LBS | OXYGEN SATURATION: 97 % | BODY MASS INDEX: 29.74 KG/M2

## 2022-03-04 DIAGNOSIS — R05.9 COUGH: Primary | ICD-10-CM

## 2022-03-04 DIAGNOSIS — J06.9 ACUTE URI: ICD-10-CM

## 2022-03-04 DIAGNOSIS — Z3A.19 19 WEEKS GESTATION OF PREGNANCY: ICD-10-CM

## 2022-03-04 PROCEDURE — 99213 OFFICE O/P EST LOW 20 MIN: CPT | Performed by: FAMILY MEDICINE

## 2022-03-04 RX ORDER — BROMPHENIRAMINE MALEATE, PSEUDOEPHEDRINE HYDROCHLORIDE, AND DEXTROMETHORPHAN HYDROBROMIDE 2; 30; 10 MG/5ML; MG/5ML; MG/5ML
5 SYRUP ORAL 4 TIMES DAILY PRN
Qty: 120 ML | Refills: 0 | Status: SHIPPED | OUTPATIENT
Start: 2022-03-04 | End: 2022-03-04

## 2022-03-04 NOTE — PROGRESS NOTES
Subjective   Gisella Mora is a 36 y.o. female.     History of Present Illness     3 days with cough and congestion  Mild sinus pressure  + cough as well  Pt is pregnant        Review of Systems   Constitutional: Negative for fever.   HENT: Positive for congestion.    Respiratory: Positive for cough. Negative for shortness of breath.        Objective   Physical Exam  Vitals and nursing note reviewed.   Constitutional:       General: She is not in acute distress.     Appearance: Normal appearance. She is well-developed.   Cardiovascular:      Rate and Rhythm: Normal rate and regular rhythm.      Heart sounds: Normal heart sounds.   Pulmonary:      Effort: Pulmonary effort is normal.      Comments: Coarse BS but no rhonchi and no wheezing  Neurological:      Mental Status: She is alert and oriented to person, place, and time.   Psychiatric:         Mood and Affect: Mood normal.         Behavior: Behavior normal.         Thought Content: Thought content normal.         Judgment: Judgment normal.         Assessment/Plan   Diagnoses and all orders for this visit:    1. Cough (Primary)  -     COVID-19,[facility] ROUTINE, NP/OP SWAB IN TRANSPORT MEDIA OR ESWAB 72 HR TAT - Swab, Nasopharynx  -     Discontinue: brompheniramine-pseudoephedrine-DM 30-2-10 MG/5ML syrup; Take 5 mL by mouth 4 (Four) Times a Day As Needed for Congestion or Cough.  Dispense: 120 mL; Refill: 0  -     dextromethorphan 15 MG/5ML syrup; Take 10 mL by mouth 4 (Four) Times a Day As Needed for Cough.  Dispense: 120 mL; Refill: 0    2. Acute URI  -     COVID-19,[facility] ROUTINE, NP/OP SWAB IN TRANSPORT MEDIA OR ESWAB 72 HR TAT - Swab, Nasopharynx  -     Discontinue: brompheniramine-pseudoephedrine-DM 30-2-10 MG/5ML syrup; Take 5 mL by mouth 4 (Four) Times a Day As Needed for Congestion or Cough.  Dispense: 120 mL; Refill: 0  -     dextromethorphan 15 MG/5ML syrup; Take 10 mL by mouth 4 (Four) Times a Day As Needed for Cough.  Dispense: 120 mL; Refill: 0    3.  19 weeks gestation of pregnancy    DM cough medicine is safe with pregnancy.  Will check COVID and f/u pending results.  Pt agrees.

## 2022-03-05 LAB
LABCORP SARS-COV-2, NAA 2 DAY TAT: NORMAL
SARS-COV-2 RNA RESP QL NAA+PROBE: NOT DETECTED

## 2022-03-09 ENCOUNTER — TELEPHONE (OUTPATIENT)
Dept: FAMILY MEDICINE CLINIC | Facility: CLINIC | Age: 37
End: 2022-03-09

## 2022-03-09 DIAGNOSIS — R05.9 COUGH: ICD-10-CM

## 2022-03-09 DIAGNOSIS — J06.9 ACUTE URI: ICD-10-CM

## 2022-03-09 NOTE — TELEPHONE ENCOUNTER
Caller: [facility] [address] - [address] [facility] AT [facility] - [phone]  - [phone]    Relationship: Pharmacy    Best call back number: [phone]    What was the call regarding: PHARMACY STATES THAT THEY WOULD LIKE A CALL ABOUT THE PATIENTS dextromethorphan 15 MG/5ML syrup.    Do you require a callback: YES

## 2022-03-09 NOTE — TELEPHONE ENCOUNTER
Pharmacist unable to order that strength however Delsym OTC is the closest to it. Gave v/o that was ok. They will notify pt.

## 2022-12-02 ENCOUNTER — TELEMEDICINE (OUTPATIENT)
Dept: FAMILY MEDICINE CLINIC | Facility: TELEHEALTH | Age: 37
End: 2022-12-02

## 2022-12-02 VITALS — WEIGHT: 176 LBS | BODY MASS INDEX: 27.62 KG/M2 | HEIGHT: 67 IN

## 2022-12-02 DIAGNOSIS — J03.90 TONSILLITIS: Primary | ICD-10-CM

## 2022-12-02 DIAGNOSIS — J35.8 TONSILLOLITH: ICD-10-CM

## 2022-12-02 DIAGNOSIS — H66.90 ACUTE OTITIS MEDIA, UNSPECIFIED OTITIS MEDIA TYPE: ICD-10-CM

## 2022-12-02 PROBLEM — F41.9 ANXIETY: Status: ACTIVE | Noted: 2018-07-05

## 2022-12-02 PROBLEM — Z30.09 FAMILY PLANNING: Status: ACTIVE | Noted: 2018-07-05

## 2022-12-02 PROCEDURE — 99213 OFFICE O/P EST LOW 20 MIN: CPT | Performed by: NURSE PRACTITIONER

## 2022-12-02 RX ORDER — LEVOCETIRIZINE DIHYDROCHLORIDE 5 MG/1
5 TABLET, FILM COATED ORAL EVERY EVENING
Qty: 30 TABLET | Refills: 0 | Status: SHIPPED | OUTPATIENT
Start: 2022-12-02 | End: 2022-12-12

## 2022-12-02 RX ORDER — FLUTICASONE PROPIONATE 50 MCG
2 SPRAY, SUSPENSION (ML) NASAL DAILY
Qty: 18 G | Refills: 0 | Status: SHIPPED | OUTPATIENT
Start: 2022-12-02 | End: 2022-12-12

## 2022-12-02 RX ORDER — AMOXICILLIN 875 MG/1
875 TABLET, COATED ORAL 2 TIMES DAILY
Qty: 20 TABLET | Refills: 0 | Status: SHIPPED | OUTPATIENT
Start: 2022-12-02 | End: 2022-12-12

## 2022-12-02 RX ORDER — SERTRALINE HYDROCHLORIDE 100 MG/1
TABLET, FILM COATED ORAL
COMMUNITY
Start: 2020-01-01

## 2022-12-02 NOTE — PATIENT INSTRUCTIONS
Tonsillitis  Tonsillitis is an infection of the throat that causes the tonsils to become red, tender, and swollen. Tonsils are tissues in the back of your throat. Each tonsil has crevices (crypts). Tonsils normally work to protect the body from infection.  What are the causes?  Sudden (acute) tonsillitis may be caused by a virus or bacteria, including streptococcal bacteria. Long-lasting (chronic) tonsillitis occurs when the crypts of the tonsils become filled with pieces of food and bacteria, which makes it easy for the tonsils to become repeatedly infected.  Tonsillitis can be spread from person to person when it is caused by a virus or bacteria. It may be spread by inhaling droplets that are released with coughing or sneezing. You may also come into contact with viruses or bacteria on surfaces, such as cups or utensils.  What are the signs or symptoms?  Symptoms of this condition include:  A sore throat. This may include trouble swallowing.  White patches on the tonsils.  Swollen tonsils.  Fever.  Headache.  Tiredness.  Loss of appetite.  Snoring during sleep when you did not snore before.  Small, foul-smelling, yellowish-white pieces of material (tonsilloliths) that you occasionally cough up or spit out. These can cause you to have bad breath.  How is this diagnosed?  This condition is diagnosed with a physical exam. Diagnosis can be confirmed with the results of lab tests, including a throat culture.  How is this treated?  Treatment for this condition depends on the cause, but usually focuses on treating the symptoms associated with it. Treatment may include:  Medicines to relieve pain and manage fever.  Steroid medicines to reduce swelling.  Antibiotic medicines if the condition is caused by bacteria.  If episodes of tonsillitis are severe and frequent, your health care provider may recommend surgery to remove the tonsils (tonsillectomy).  Follow these instructions at home:  Medicines  Take over-the-counter  and prescription medicines only as told by your health care provider.  If you were prescribed an antibiotic medicine, take it as told by your health care provider. Do not stop taking the antibiotic even if you start to feel better.  Eating and drinking  Drink enough fluid to keep your urine pale yellow.  While your throat is sore, eat soft or liquid foods, such as sherbet, soups, or soft, warm cereals, such as oatmeal or hot wheat cereal.  Drink warm liquids.  Eat frozen ice pops.  General instructions  Rest as much as possible and get plenty of sleep.  Gargle with a mixture of salt and water 3-4 times a day or as needed.  To make salt water, completely dissolve ½-1 tsp (3-6 g) of salt in 1 cup (237 mL) of warm water.  Do not swallow the mixture of salt and water.  Wash your hands regularly with soap and water for at least 20 seconds. If soap and water are not available, use hand .  Do not share cups, bottles, or other utensils until your symptoms have gone away.  Do not use any products that contain nicotine or tobacco. These products include cigarettes, chewing tobacco, and vaping devices, such as e-cigarettes. If you need help quitting, ask your health care provider.  Keep all follow-up visits. This is important.  Contact a health care provider if:  You notice large, tender lumps in your neck that were not there before.  You have a fever that does not go away after 2-3 days.  You develop a rash.  You cough up a green, yellow-brown, or bloody substance.  You cannot swallow liquids or food for 24 hours.  Only one of your tonsils is swollen.  Get help right away if:  You develop any new symptoms, such as vomiting, severe headache, stiff neck, chest pain, trouble breathing, or trouble swallowing.  You have severe throat pain along with drooling or voice changes.  You have severe pain that is not controlled with medicines.  You cannot fully open your mouth.  You develop redness, swelling, or severe pain  anywhere in your neck.  Summary  Tonsillitis is an infection of the throat that causes the tonsils to become red, tender, and swollen. The most common symptom is pain in the throat.  Tonsillitis is most often caused by a virus or bacteria.  Get help right away if you develop any new symptoms, such as vomiting, severe headache, stiff neck, chest pain, or trouble breathing.  This information is not intended to replace advice given to you by your health care provider. Make sure you discuss any questions you have with your health care provider.  Document Revised: 05/12/2022 Document Reviewed: 05/12/2022  [employer] Patient Education © 2022 [employer]  Otitis Media, Adult  Otitis media occurs when there is inflammation and fluid in the middle ear with signs and symptoms of an acute infection. The middle ear is a part of the ear that contains bones for hearing as well as air that helps send sounds to the brain. When infected fluid builds up in this space, it causes pressure and can lead to an ear infection. The eustachian tube connects the middle ear to the back of the nose (nasopharynx) and normally allows air into the middle ear. If the eustachian tube becomes blocked, fluid can build up and become infected.  What are the causes?  This condition is caused by a blockage in the eustachian tube. This can be caused by mucus or by swelling of the tube. Problems that can cause a blockage include:  A cold or other upper respiratory infection.  Allergies.  An irritant, such as tobacco smoke.  Enlarged adenoids. The adenoids are areas of soft tissue located high in the back of the throat, behind the nose and the roof of the mouth. They are part of the body's defense system (immune system).  A mass in the nasopharynx.  Damage to the ear caused by pressure changes (barotrauma).  What increases the risk?  You are more likely to develop this condition if you:  Smoke or are exposed to tobacco smoke.  Have an opening in the roof of  your mouth (cleft palate).  Have gastroesophageal reflux.  Have an immune system disorder.  What are the signs or symptoms?  Symptoms of this condition include:  Ear pain.  Fever.  Decreased hearing.  Tiredness (lethargy).  Fluid leaking from the ear, if the eardrum is ruptured or has burst.  Ringing in the ear.  How is this diagnosed?  This condition is diagnosed with a physical exam. During the exam, your health care provider will use an instrument called an otoscope to look in your ear and check for redness, swelling, and fluid. He or she will also ask about your symptoms.  Your health care provider may also order tests, such as:  A pneumatic otoscopy. This is a test to check the movement of the eardrum. It is done by squeezing a small amount of air into the ear.  A tympanogram. This is a test that shows how well the eardrum moves in response to air pressure in the ear canal. It provides a graph for your health care provider to review.  How is this treated?  This condition can go away on its own within 3-5 days. But if the condition is caused by a bacterial infection and does not go away on its own, or if it keeps coming back, your health care provider may:  Prescribe antibiotic medicine to treat the infection.  Prescribe or recommend medicines to control pain.  Follow these instructions at home:  Take over-the-counter and prescription medicines only as told by your health care provider.  If you were prescribed an antibiotic medicine, take it as told by your health care provider. Do not stop taking the antibiotic even if you start to feel better.  Keep all follow-up visits. This is important.  Contact a health care provider if:  You have bleeding from your nose.  There is a lump on your neck.  You are not feeling better in 5 days.  You feel worse instead of better.  Get help right away if:  You have severe pain that is not controlled with medicine.  You have swelling, redness, or pain around your ear.  You have  stiffness in your neck.  A part of your face is not moving (paralyzed).  The bone behind your ear (mastoid bone) is tender when you touch it.  You develop a severe headache.  Summary  Otitis media is redness, soreness, and swelling of the middle ear, usually resulting in pain and decreased hearing.  This condition can go away on its own within 3-5 days.  If the problem does not go away in 3-5 days, your health care provider may give you medicines to treat the infection.  If you were prescribed an antibiotic medicine, take it as told by your health care provider.  Follow all instructions that were given to you by your health care provider.  This information is not intended to replace advice given to you by your health care provider. Make sure you discuss any questions you have with your health care provider.  Document Revised: 03/28/2022 Document Reviewed: 03/28/2022  Elsevier Patient Education © 2022 [employer] Inc.

## 2022-12-02 NOTE — PROGRESS NOTES
You have chosen to receive care through a telehealth visit.  Do you consent to use a video/audio connection for your medical care today? Yes     CHIEF COMPLAINT  Cc: sore throat, ear pain, cough    HPI  Gisella Mora is a 37 y.o. female  presents with complaint of sore throat, ear pain, cough. She reports a  very sore irritated throat, swollen tonsils, tonsil stones, ear pain and a cough for past 7 days. Her throat and ears hurt bilaterally but the the pain is > on the left. One tonsil stone came out. She is also experiencing sinus pain and pressure. Additional symptoms that she is having are noted in the ROS portion of this visit. She has been alternating tylenol and ibuprofen. She is breastfeeding and is unsure what else she can take.     Review of Systems   Constitutional: Negative for fever.   HENT: Positive for congestion, ear pain (bilateral>left), postnasal drip (mild), sinus pressure, sinus pain and sore throat (> on left). Negative for rhinorrhea and sneezing.    Respiratory: Positive for cough and chest tightness (resolved). Negative for shortness of breath and wheezing.    Cardiovascular: Negative for chest pain.   Gastrointestinal: Negative for diarrhea, nausea and vomiting.   Musculoskeletal: Negative for myalgias.   Neurological: Positive for headaches (intermittent).       Past Medical History:   Diagnosis Date   • Anxiety    • Binge eating    • Numbness and tingling in hands     ONLY WITH SVT   • Supraventricular tachycardia (HCC)        Family History   Problem Relation Age of Onset   • No Known Problems Mother    • No Known Problems Father        Social History     Socioeconomic History   • Marital status:    Tobacco Use   • Smoking status: Former     Years: 3.00     Types: Cigarettes     Quit date: 2008     Years since quittin.9   • Smokeless tobacco: Never   Vaping Use   • Vaping Use: Never used   Substance and Sexual Activity   • Alcohol use: Yes     Comment: Socially    • Drug  "use: Never   • Sexual activity: Defer       Gisella Mora  reports that she quit smoking about 14 years ago. Her smoking use included cigarettes. She has never used smokeless tobacco..    Ht 170.2 cm (67\")   Wt 79.8 kg (176 lb)   Breastfeeding Yes   BMI 27.57 kg/m²     PHYSICAL EXAM  Physical Exam   Constitutional: She is oriented to person, place, and time. She appears well-developed and well-nourished.   HENT:   Head: Normocephalic and atraumatic.   Right Ear: External ear normal.   Left Ear: There is tenderness.   Nose: Congestion present. Right sinus exhibits maxillary sinus tenderness (patient directed exam) and frontal sinus tenderness (patient directed exam). Left sinus exhibits maxillary sinus tenderness (patient directed exam) and frontal sinus tenderness (patient directed exam).   Mouth/Throat: Mucous membranes are erythematous.   Eyes: Lids are normal. Right eye exhibits no discharge and no exudate. Left eye exhibits no discharge and no exudate. Right conjunctiva is not injected. Left conjunctiva is not injected.   Pulmonary/Chest: No accessory muscle usage. No tachypnea and no bradypnea.  No respiratory distress.No use of oxygen by nasal cannulaNo use of oxygen by mask noted.  Abdominal: Abdomen appears normal.   Lymphadenopathy:        Right cervical: Anterior cervical (mildly tender, patient directed exam) adenopathy present.        Left cervical: Anterior cervical (mildly tender, patient directed exam) adenopathy present.   Neurological: She is alert and oriented to person, place, and time. No cranial nerve deficit.   Skin: Her skin appears normal.  Psychiatric: She has a normal mood and affect. Her speech is normal and behavior is normal. Judgment and thought content normal.       Results for orders placed or performed in visit on 03/04/22   COVID-19,[facility] ROUTINE, NP/OP SWAB IN TRANSPORT MEDIA OR ESWAB 72 HR TAT - Swab, Nasopharynx    Specimen: Nasopharynx; Swab   Result Value Ref Range    " SARS-CoV-2, SAMANTHA Not Detected Not Detected   SARS-CoV-2, SAMANTHA 2 DAY TAT - ,   Result Value Ref Range    [facility] SARS-COV-2, SAMANTHA 2 DAY TAT Performed        Diagnoses and all orders for this visit:    1. Tonsillitis (Primary)    2. Tonsillolith    3. Acute otitis media, unspecified otitis media type    Other orders  -     amoxicillin (AMOXIL) 875 MG tablet; Take 1 tablet by mouth 2 (Two) Times a Day for 10 days.  Dispense: 20 tablet; Refill: 0  -     fluticasone (FLONASE) 50 MCG/ACT nasal spray; 2 sprays into the nostril(s) as directed by provider Daily for 10 days. Administer 2 sprays in each nostril for each dose.  Dispense: 18 g; Refill: 0  -     levocetirizine (XYZAL) 5 MG tablet; Take 1 tablet by mouth Every Evening for 10 days.  Dispense: 30 tablet; Refill: 0    Probiotics for two weeks related to taking antibiotics. The pharmacist can help you with this if needed. Do not take within two hours of antibiotic.    Alternate tylenol and ibuprofen for pain or fever  Hydrate well  May gargle with warm salt water  May try Flonase as directed  Xyzal as directed  Stop Xyzal if decrease in mild production noted hot tea with lemon and honey    FOLLOW-UP  If symptoms worsen or persist follow up with PCP, Virtual Care or Urgent Care    Patient verbalizes understanding of medication dosage, comfort measures, instructions for treatment and follow-up.    Ayana Alexandre, APRN  12/02/2022  14:51 EST    The use of a video visit has been reviewed with the patient and verbal informed consent has been obtained. Myself and Gisella Mora participated in this visit. The patient is located in [address] [place] [address].    I am located in [place], [place]. Mychart and Zoom were utilized. I spent 25 minutes in the patient's chart for this visit.

## 2024-02-13 ENCOUNTER — TELEPHONE (OUTPATIENT)
Dept: FAMILY MEDICINE CLINIC | Facility: CLINIC | Age: 39
End: 2024-02-13

## 2024-02-13 NOTE — TELEPHONE ENCOUNTER
Caller: Gisella Mora    Relationship: Self    Best call back number: [phone]    Who is your current provider: MARIO    Is your current provider offboarding? NO    Who would you like your new provider to be: LEXII    What are your reasons for transferring care: THE PATIENT STATES THAT SHE WANTS TO SEE DOCTOR LEXII AS HER PRIMARY SHE ONLY SAW DOCTOR MARIO BECAUSE SHE WAS SICK AND COULD NOT SEE DOCTOR LEXII   PLEASE CALL PATIENT TO LET HER KNOW IF THAT CAN BE CHANGED

## 2024-02-21 ENCOUNTER — OFFICE VISIT (OUTPATIENT)
Dept: FAMILY MEDICINE CLINIC | Facility: CLINIC | Age: 39
End: 2024-02-21
Payer: COMMERCIAL

## 2024-02-21 VITALS
HEIGHT: 67 IN | WEIGHT: 192.6 LBS | BODY MASS INDEX: 30.23 KG/M2 | HEART RATE: 64 BPM | RESPIRATION RATE: 18 BRPM | SYSTOLIC BLOOD PRESSURE: 110 MMHG | OXYGEN SATURATION: 97 % | DIASTOLIC BLOOD PRESSURE: 66 MMHG | TEMPERATURE: 98 F

## 2024-02-21 DIAGNOSIS — R10.84 GENERALIZED ABDOMINAL PAIN: Primary | ICD-10-CM

## 2024-02-21 DIAGNOSIS — R19.7 DIARRHEA, UNSPECIFIED TYPE: ICD-10-CM

## 2024-02-21 DIAGNOSIS — R14.0 BLOATING: ICD-10-CM

## 2024-02-21 PROCEDURE — 99214 OFFICE O/P EST MOD 30 MIN: CPT | Performed by: FAMILY MEDICINE

## 2024-02-21 NOTE — PROGRESS NOTES
Chief Complaint  Abdominal Pain (Centered around belly button, feels sharp pains, when it happens she will lose her appetite. Possible referral for gastro if not easy to resolve. Started a couple years ago, but this episode started Sunday morning. Typically an episode will last 3-4 days with the start being the worst.)    Subjective          Gisella Mora presents to [facility]  Abdominal Pain  This is a chronic problem. The current episode started more than 1 year ago. The pain is located in the periumbilical region, epigastric region and suprapubic region. The quality of the pain is sharp (squeezing). The abdominal pain does not radiate. Associated symptoms include diarrhea. Pertinent negatives include no belching, constipation, hematochezia, melena, nausea or vomiting. Associated symptoms comments: Decreased appetite when pain is present  . Treatments tried: Pepto bismol, Tums.   For a while, she was taking increased ibuprofen. Thought that it might be contributing to her stomach pain, so she stopped taking ibuprofen.   Stomach pain described as feeling like she was punched in the stomach. She also binge eats, wonders if this could contribute to some of her abdominal symptoms.     The following portions of the patient's history were reviewed and updated as appropriate: allergies, current medications, past family history, past medical history, past social history, past surgical history, and problem list.    Objective      Physical Exam  Vitals reviewed.   Cardiovascular:      Rate and Rhythm: Normal rate.      Heart sounds: Normal heart sounds.   Pulmonary:      Effort: Pulmonary effort is normal.      Breath sounds: Normal breath sounds.   Abdominal:      Palpations: Abdomen is soft.      Tenderness:  in the right lower quadrant, epigastric area, periumbilical area and suprapubic area There is no guarding.   Neurological:      Mental Status: She is alert.        Result Review :                 Assessment and Plan    Diagnoses and all orders for this visit:    1. Generalized abdominal pain (Primary)  -     Ambulatory Referral to Gastroenterology  -     CBC & Differential  -     Comprehensive Metabolic Panel  -     TSH  -     Celiac Comprehensive Panel  -     Lipase  -     CT Abdomen Pelvis With Contrast; Future    2. Bloating  -     Ambulatory Referral to Gastroenterology  -     CBC & Differential  -     Comprehensive Metabolic Panel  -     TSH  -     Celiac Comprehensive Panel  -     Lipase  -     CT Abdomen Pelvis With Contrast; Future    3. Diarrhea, unspecified type  -     CT Abdomen Pelvis With Contrast; Future    Labs updated today.  Will also evaluate chronic abdominal pain with CT and gastroenterology consultation.    Follow Up   No follow-ups on file.  Patient was given instructions and counseling regarding her condition or for health maintenance advice. Please see specific information pulled into the AVS if appropriate.

## 2024-02-22 LAB
ALBUMIN SERPL-MCNC: 4.8 G/DL (ref 3.9–4.9)
ALBUMIN/GLOB SERPL: 1.8 {RATIO} (ref 1.2–2.2)
ALP SERPL-CCNC: 85 IU/L (ref 44–121)
ALT SERPL-CCNC: 21 IU/L (ref 0–32)
AST SERPL-CCNC: 17 IU/L (ref 0–40)
BASOPHILS # BLD AUTO: 0.1 X10E3/UL (ref 0–0.2)
BASOPHILS NFR BLD AUTO: 1 %
BILIRUB SERPL-MCNC: 0.4 MG/DL (ref 0–1.2)
BUN SERPL-MCNC: 19 MG/DL (ref 6–20)
BUN/CREAT SERPL: 32 (ref 9–23)
CALCIUM SERPL-MCNC: 9.6 MG/DL (ref 8.7–10.2)
CHLORIDE SERPL-SCNC: 103 MMOL/L (ref 96–106)
CO2 SERPL-SCNC: 22 MMOL/L (ref 20–29)
CREAT SERPL-MCNC: 0.6 MG/DL (ref 0.57–1)
EGFRCR SERPLBLD CKD-EPI 2021: 118 ML/MIN/1.73
ENDOMYSIUM IGA SER QL: NEGATIVE
EOSINOPHIL # BLD AUTO: 0.1 X10E3/UL (ref 0–0.4)
EOSINOPHIL NFR BLD AUTO: 1 %
ERYTHROCYTE [DISTWIDTH] IN BLOOD BY AUTOMATED COUNT: 12.8 % (ref 11.7–15.4)
GLIADIN PEPTIDE IGA SER-ACNC: 7 UNITS (ref 0–19)
GLIADIN PEPTIDE IGG SER-ACNC: 3 UNITS (ref 0–19)
GLOBULIN SER CALC-MCNC: 2.6 G/DL (ref 1.5–4.5)
GLUCOSE SERPL-MCNC: 87 MG/DL (ref 70–99)
HCT VFR BLD AUTO: 42.2 % (ref 34–46.6)
HGB BLD-MCNC: 13.7 G/DL (ref 11.1–15.9)
IGA SERPL-MCNC: 208 MG/DL (ref 87–352)
IMM GRANULOCYTES # BLD AUTO: 0 X10E3/UL (ref 0–0.1)
IMM GRANULOCYTES NFR BLD AUTO: 0 %
LIPASE SERPL-CCNC: 34 U/L (ref 14–72)
LYMPHOCYTES # BLD AUTO: 1.6 X10E3/UL (ref 0.7–3.1)
LYMPHOCYTES NFR BLD AUTO: 20 %
MCH RBC QN AUTO: 27.6 PG (ref 26.6–33)
MCHC RBC AUTO-ENTMCNC: 32.5 G/DL (ref 31.5–35.7)
MCV RBC AUTO: 85 FL (ref 79–97)
MONOCYTES # BLD AUTO: 0.4 X10E3/UL (ref 0.1–0.9)
MONOCYTES NFR BLD AUTO: 5 %
NEUTROPHILS # BLD AUTO: 5.9 X10E3/UL (ref 1.4–7)
NEUTROPHILS NFR BLD AUTO: 73 %
PLATELET # BLD AUTO: 293 X10E3/UL (ref 150–450)
POTASSIUM SERPL-SCNC: 4.6 MMOL/L (ref 3.5–5.2)
PROT SERPL-MCNC: 7.4 G/DL (ref 6–8.5)
RBC # BLD AUTO: 4.97 X10E6/UL (ref 3.77–5.28)
SODIUM SERPL-SCNC: 139 MMOL/L (ref 134–144)
TSH SERPL DL<=0.005 MIU/L-ACNC: 1 UIU/ML (ref 0.45–4.5)
TTG IGA SER-ACNC: <2 U/ML (ref 0–3)
TTG IGG SER-ACNC: 2 U/ML (ref 0–5)
WBC # BLD AUTO: 8.1 X10E3/UL (ref 3.4–10.8)

## 2024-02-29 ENCOUNTER — HOSPITAL ENCOUNTER (OUTPATIENT)
Dept: CT IMAGING | Facility: HOSPITAL | Age: 39
Discharge: HOME | End: 2024-02-29
Admitting: FAMILY MEDICINE
Payer: COMMERCIAL

## 2024-02-29 DIAGNOSIS — R14.0 BLOATING: ICD-10-CM

## 2024-02-29 DIAGNOSIS — R10.84 GENERALIZED ABDOMINAL PAIN: ICD-10-CM

## 2024-02-29 DIAGNOSIS — R19.7 DIARRHEA, UNSPECIFIED TYPE: ICD-10-CM

## 2024-02-29 PROCEDURE — 25510000001 IOPAMIDOL 61 % SOLUTION: Performed by: FAMILY MEDICINE

## 2024-02-29 PROCEDURE — 74177 CT ABD & PELVIS W/CONTRAST: CPT

## 2024-02-29 RX ADMIN — IOPAMIDOL 95 ML: 612 INJECTION, SOLUTION INTRAVENOUS at 12:12

## 2024-03-01 ENCOUNTER — NURSE TRIAGE (OUTPATIENT)
Dept: CALL CENTER | Facility: HOSPITAL | Age: 39
End: 2024-03-01
Payer: COMMERCIAL

## 2024-03-02 NOTE — TELEPHONE ENCOUNTER
"Received call from Dr. Mendoza, radiologist, stating read CT scan and showed acute appendicitis, requesting to speak with on-call provider. Dr. Watkins ordered CT scan.  @[phone] attempted to contact ROYER Mckenna, on-call provider, no answer, message left to return call  @[phone] attempted to contact ROYER Mckenna, via secure chat  @[phone] attempted to call ROYER Mckenna,a gain, no answer  @[phone] attempted to call Dr. Watkins, voicemail left  @[phone] attempted to call , Yumiko Minor, vm left to return call  @2222 attempted to call ROYER Mckenna, again, msg left  @[phone] Notified Dr. Mendoza, unable to contact provider yet, will continue to try  @ [phone] received msg via secure chat from ROYER Mckenna, stated to send pt to ER immediately  @[phone] Instructed pt to go to ER, advised that CT scan showed acute appendicitis and provider stated to go to ER.     Reason for Disposition   Lab or radiology calling with CRITICAL test results    Additional Information   Negative: Lab calling with strep throat test results and triager can call in prescription   Negative: Lab calling with urinalysis test results and triager can call in prescription   Negative: Medication questions   Negative: Medication renewal and refill questions   Negative: Pre-operative or pre-procedural questions   Negative: ED call to PCP (i.e., primary care provider; doctor, NP, or PA)   Negative: Doctor (or NP/PA) call to PCP   Negative: Call about patient who is currently hospitalized    Answer Assessment - Initial Assessment Questions  1. REASON FOR CALL or QUESTION: \"What is your reason for calling today?\" or \"How can I best  help you?\" or \"What question do you have that I can help answer?\"      Calling CT result, reported CT showing acute appendicitis  2. CALLER: Document the source of call. (e.g., laboratory, patient).      Dr. Mendoza    Protocols used: PCP Call - No Triage-ADULT-AH    "

## 2024-03-08 ENCOUNTER — OFFICE VISIT (OUTPATIENT)
Dept: GASTROENTEROLOGY | Facility: CLINIC | Age: 39
End: 2024-03-08
Payer: COMMERCIAL

## 2024-03-08 VITALS
OXYGEN SATURATION: 99 % | TEMPERATURE: 97.3 F | DIASTOLIC BLOOD PRESSURE: 78 MMHG | HEIGHT: 67 IN | WEIGHT: 188 LBS | HEART RATE: 72 BPM | SYSTOLIC BLOOD PRESSURE: 116 MMHG | BODY MASS INDEX: 29.51 KG/M2

## 2024-03-08 DIAGNOSIS — R10.13 EPIGASTRIC PAIN: Primary | ICD-10-CM

## 2024-03-08 DIAGNOSIS — K36 OTHER APPENDICITIS: ICD-10-CM

## 2024-03-08 NOTE — PROGRESS NOTES
New Patient Consultation     Patient Name: Gisella Mora  : 1985   MRN: [id number]     Chief Complaint:    Chief Complaint   Patient presents with    Abdominal Pain       History of Present Illness: Gisella Mora is a 38 y.o. female who is here today for a Gastroenterology Consultation for generalized abdominal pain    Gisella reports epigastric pain episodes (like a gut punch) that will last a few days and then resolve.  There is no nausea or vomiting.  This happens a few times a year for the past few years. She was taking a lot of nsaids at one point and thought this may have been the cause. She has about NSAIDs altogether.    She has normal bms      Thought to be related to NSAIDs    Her paternal g-mother had colon cancer in her 70;s.       When going to perform the CT abdomen pelvis for the above problem, she developed a little bit different kind of pain.  Her CT showed acute appendicitis and she was seen at [facility].  She is now followed by a surgeon there and conservative management has been adopted.  She is currently on Augmentin and has a follow-up next week.  She is feeling better.    CT Abdomen Pelvis With Contrast (2024 12:12) Impression:  1.Acute uncomplicated appendicitis.  Subjective      Review of Systems:   Review of Systems   Constitutional:  Negative for appetite change and unexpected weight loss.   HENT:  Negative for trouble swallowing.    Gastrointestinal:  Positive for abdominal pain. Negative for abdominal distention, anal bleeding, blood in stool, constipation, diarrhea, nausea, rectal pain, vomiting, GERD and indigestion.       Past Medical History:   Past Medical History:   Diagnosis Date    Anxiety     Binge eating     Depression     Numbness and tingling in hands     ONLY WITH SVT    Supraventricular tachycardia        Past Surgical History:   Past Surgical History:   Procedure Laterality Date    CARDIAC ELECTROPHYSIOLOGY PROCEDURE N/A 2017    Procedure:  "Ablation SVT;  Surgeon: Joby Fuentes MD;  Location: [facility] INVASIVE LOCATION;  Service:     TUBAL ABDOMINAL LIGATION      WISDOM TOOTH EXTRACTION         Family History:   Family History   Problem Relation Age of Onset    No Known Problems Mother     No Known Problems Father        Social History:   Social History     Socioeconomic History    Marital status:    Tobacco Use    Smoking status: Former     Current packs/day: 0.00     Average packs/day: 0.5 packs/day for 3.0 years (1.5 ttl pk-yrs)     Types: Cigarettes     Start date: 2005     Quit date: 2008     Years since quittin.1    Smokeless tobacco: Never   Vaping Use    Vaping status: Never Used   Substance and Sexual Activity    Alcohol use: Yes     Comment: Socially     Drug use: Never    Sexual activity: Not Currently       Alcohol/Tobacco History:   Social History     Substance and Sexual Activity   Alcohol Use Yes    Comment: Socially      Social History     Tobacco Use   Smoking Status Former    Current packs/day: 0.00    Average packs/day: 0.5 packs/day for 3.0 years (1.5 ttl pk-yrs)    Types: Cigarettes    Start date: 2005    Quit date: 2008    Years since quittin.1   Smokeless Tobacco Never       Medications:     Current Outpatient Medications:     sertraline (Zoloft) 100 MG tablet, , Disp: , Rfl:     Allergies:   No Known Allergies    Objective     Physical Exam:  Vital Signs:   Vitals:    24 0943   BP: 116/78   Pulse: 72   Temp: 97.3 °F (36.3 °C)   SpO2: 99%   Weight: 85.3 kg (188 lb)   Height: 170.2 cm (67.01\")     Body mass index is 29.44 kg/m².     Physical Exam  Vitals and nursing note reviewed.   Constitutional:       General: She is not in acute distress.     Appearance: She is well-developed. She is not diaphoretic.   Eyes:      General: No scleral icterus.     Conjunctiva/sclera: Conjunctivae normal.   Neck:      Thyroid: No thyromegaly.   Cardiovascular:      Rate and Rhythm: Normal rate and " regular rhythm.   Pulmonary:      Effort: Pulmonary effort is normal.      Breath sounds: Normal breath sounds.   Abdominal:      General: Bowel sounds are normal. There is no distension.      Palpations: Abdomen is soft.      Tenderness: There is abdominal tenderness in the right lower quadrant, epigastric area and periumbilical area. There is no guarding or rebound.      Hernia: No hernia is present.      Comments: Mild tenderness over McBurney's point   Musculoskeletal:      Cervical back: Neck supple.      Right lower leg: No edema.      Left lower leg: No edema.   Skin:     General: Skin is warm and dry.      Capillary Refill: Capillary refill takes 2 to 3 seconds.      Coloration: Skin is not jaundiced or pale.      Findings: No bruising or petechiae.      Nails: There is no clubbing.   Neurological:      Mental Status: She is alert and oriented to person, place, and time.   Psychiatric:         Behavior: Behavior normal.         Thought Content: Thought content normal.         Judgment: Judgment normal.       Reviewed referring provider office note, labs, imaging  Assessment / Plan      Assessment/Plan:   Diagnoses and all orders for this visit:    1. Epigastric pain (Primary)  -     Ambulatory referral for Screening EGD  Suspect recurrent episodes of gastritis.  She has been tested for H. pylori in the past and this was negative.  Agree with avoidance of NSAIDs.  Diet information given.  Recommend EGD given longevity of problem  2. Other appendicitis  Keep scheduled follow-up with surgeon, complete antibiotic.  If symptoms return/ worsen-report back to emergency room       Follow Up:   No follow-ups on file.    Plan of care reviewed with the patient at the conclusion of today's visit.  Education was provided regarding diagnosis, management, and any prescribed or recommended OTC medications.  Patient verbalized understanding of and agreement with management plan.       YEN Landrum  [facility] [place]ology

## 2024-03-28 ENCOUNTER — OUTSIDE FACILITY SERVICE (OUTPATIENT)
Dept: GASTROENTEROLOGY | Facility: CLINIC | Age: 39
End: 2024-03-28
Payer: COMMERCIAL

## 2024-03-28 PROCEDURE — 88305 TISSUE EXAM BY PATHOLOGIST: CPT | Performed by: INTERNAL MEDICINE

## 2024-03-28 PROCEDURE — 43239 EGD BIOPSY SINGLE/MULTIPLE: CPT | Performed by: INTERNAL MEDICINE

## 2024-03-29 ENCOUNTER — LAB REQUISITION (OUTPATIENT)
Dept: LAB | Facility: HOSPITAL | Age: 39
End: 2024-03-29
Payer: COMMERCIAL

## 2024-03-29 DIAGNOSIS — R10.9 UNSPECIFIED ABDOMINAL PAIN: ICD-10-CM

## 2024-03-29 DIAGNOSIS — K31.89 OTHER DISEASES OF STOMACH AND DUODENUM: ICD-10-CM

## 2024-03-29 DIAGNOSIS — R10.13 EPIGASTRIC PAIN: ICD-10-CM

## 2024-04-01 LAB — REF LAB TEST METHOD: NORMAL

## 2024-05-09 ENCOUNTER — OFFICE VISIT (OUTPATIENT)
Dept: FAMILY MEDICINE CLINIC | Facility: CLINIC | Age: 39
End: 2024-05-09
Payer: COMMERCIAL

## 2024-05-09 VITALS
BODY MASS INDEX: 30.42 KG/M2 | HEART RATE: 74 BPM | HEIGHT: 67 IN | SYSTOLIC BLOOD PRESSURE: 124 MMHG | RESPIRATION RATE: 16 BRPM | TEMPERATURE: 97.3 F | WEIGHT: 193.8 LBS | DIASTOLIC BLOOD PRESSURE: 70 MMHG | OXYGEN SATURATION: 98 %

## 2024-05-09 DIAGNOSIS — Z11.59 ENCOUNTER FOR HEPATITIS C SCREENING TEST FOR LOW RISK PATIENT: ICD-10-CM

## 2024-05-09 DIAGNOSIS — F33.1 MODERATE EPISODE OF RECURRENT MAJOR DEPRESSIVE DISORDER: ICD-10-CM

## 2024-05-09 DIAGNOSIS — Z00.00 ANNUAL PHYSICAL EXAM: Primary | ICD-10-CM

## 2024-05-09 DIAGNOSIS — F41.9 ANXIETY: ICD-10-CM

## 2024-05-09 DIAGNOSIS — R63.8 UNABLE TO LOSE WEIGHT: ICD-10-CM

## 2024-05-09 DIAGNOSIS — E66.9 OBESITY (BMI 30-39.9): ICD-10-CM

## 2024-05-09 PROCEDURE — 99395 PREV VISIT EST AGE 18-39: CPT | Performed by: FAMILY MEDICINE

## 2024-05-09 RX ORDER — SEMAGLUTIDE 0.25 MG/.5ML
0.25 INJECTION, SOLUTION SUBCUTANEOUS WEEKLY
Qty: 2 ML | Refills: 0 | Status: SHIPPED | OUTPATIENT
Start: 2024-05-09

## 2024-05-10 LAB
ALBUMIN SERPL-MCNC: 4.5 G/DL (ref 3.9–4.9)
ALBUMIN/GLOB SERPL: 1.7 {RATIO} (ref 1.2–2.2)
ALP SERPL-CCNC: 75 IU/L (ref 44–121)
ALT SERPL-CCNC: 21 IU/L (ref 0–32)
AST SERPL-CCNC: 15 IU/L (ref 0–40)
BILIRUB SERPL-MCNC: 0.2 MG/DL (ref 0–1.2)
BUN SERPL-MCNC: 14 MG/DL (ref 6–20)
BUN/CREAT SERPL: 21 (ref 9–23)
CALCIUM SERPL-MCNC: 9.5 MG/DL (ref 8.7–10.2)
CHLORIDE SERPL-SCNC: 103 MMOL/L (ref 96–106)
CHOLEST SERPL-MCNC: 146 MG/DL (ref 100–199)
CHOLEST/HDLC SERPL: 3.3 RATIO (ref 0–4.4)
CO2 SERPL-SCNC: 21 MMOL/L (ref 20–29)
CREAT SERPL-MCNC: 0.67 MG/DL (ref 0.57–1)
EGFRCR SERPLBLD CKD-EPI 2021: 115 ML/MIN/1.73
ERYTHROCYTE [DISTWIDTH] IN BLOOD BY AUTOMATED COUNT: 13 % (ref 11.7–15.4)
GLOBULIN SER CALC-MCNC: 2.7 G/DL (ref 1.5–4.5)
GLUCOSE SERPL-MCNC: 127 MG/DL (ref 70–99)
HBA1C MFR BLD: 5.5 % (ref 4.8–5.6)
HCT VFR BLD AUTO: 40.5 % (ref 34–46.6)
HCV IGG SERPL QL IA: NON REACTIVE
HDLC SERPL-MCNC: 44 MG/DL
HGB BLD-MCNC: 13.7 G/DL (ref 11.1–15.9)
LDLC SERPL CALC-MCNC: 77 MG/DL (ref 0–99)
MCH RBC QN AUTO: 28.2 PG (ref 26.6–33)
MCHC RBC AUTO-ENTMCNC: 33.8 G/DL (ref 31.5–35.7)
MCV RBC AUTO: 83 FL (ref 79–97)
PLATELET # BLD AUTO: 259 X10E3/UL (ref 150–450)
POTASSIUM SERPL-SCNC: 4.1 MMOL/L (ref 3.5–5.2)
PROT SERPL-MCNC: 7.2 G/DL (ref 6–8.5)
RBC # BLD AUTO: 4.86 X10E6/UL (ref 3.77–5.28)
SODIUM SERPL-SCNC: 138 MMOL/L (ref 134–144)
TRIGL SERPL-MCNC: 146 MG/DL (ref 0–149)
TSH SERPL DL<=0.005 MIU/L-ACNC: 1.1 UIU/ML (ref 0.45–4.5)
VLDLC SERPL CALC-MCNC: 25 MG/DL (ref 5–40)
WBC # BLD AUTO: 8.9 X10E3/UL (ref 3.4–10.8)

## 2024-05-16 ENCOUNTER — PRIOR AUTHORIZATION (OUTPATIENT)
Dept: FAMILY MEDICINE CLINIC | Facility: CLINIC | Age: 39
End: 2024-05-16
Payer: COMMERCIAL

## 2024-05-28 ENCOUNTER — OFFICE VISIT (OUTPATIENT)
Dept: FAMILY MEDICINE CLINIC | Facility: CLINIC | Age: 39
End: 2024-05-28
Payer: COMMERCIAL

## 2024-05-28 VITALS
HEIGHT: 67 IN | DIASTOLIC BLOOD PRESSURE: 72 MMHG | TEMPERATURE: 97.5 F | WEIGHT: 194.6 LBS | OXYGEN SATURATION: 99 % | SYSTOLIC BLOOD PRESSURE: 110 MMHG | RESPIRATION RATE: 14 BRPM | BODY MASS INDEX: 30.54 KG/M2 | HEART RATE: 78 BPM

## 2024-05-28 DIAGNOSIS — L56.8 PHOTOSENSITIVITY DERMATITIS: ICD-10-CM

## 2024-05-28 DIAGNOSIS — R21 RASH: Primary | ICD-10-CM

## 2024-05-28 PROBLEM — I47.10 SVT (SUPRAVENTRICULAR TACHYCARDIA): Status: RESOLVED | Noted: 2017-02-02 | Resolved: 2024-05-28

## 2024-05-28 PROBLEM — L73.2 HIDRADENITIS SUPPURATIVA: Status: RESOLVED | Noted: 2021-09-09 | Resolved: 2024-05-28

## 2024-05-28 LAB
EXPIRATION DATE: NORMAL
INTERNAL CONTROL: NORMAL
Lab: NORMAL
S PYO AG THROAT QL: NEGATIVE

## 2024-05-28 PROCEDURE — 87880 STREP A ASSAY W/OPTIC: CPT | Performed by: NURSE PRACTITIONER

## 2024-05-28 PROCEDURE — 99213 OFFICE O/P EST LOW 20 MIN: CPT | Performed by: NURSE PRACTITIONER

## 2024-05-28 RX ORDER — TRIAMCINOLONE ACETONIDE 1 MG/G
1 OINTMENT TOPICAL 2 TIMES DAILY
Qty: 30 G | Refills: 1 | Status: SHIPPED | OUTPATIENT
Start: 2024-05-28 | End: 2024-05-28

## 2024-05-28 RX ORDER — PREDNISONE 10 MG/1
TABLET ORAL
Qty: 21 TABLET | Refills: 0 | Status: SHIPPED | OUTPATIENT
Start: 2024-05-28

## 2024-05-28 RX ORDER — PREDNISONE 10 MG/1
TABLET ORAL
Qty: 21 TABLET | Refills: 0 | Status: SHIPPED | OUTPATIENT
Start: 2024-05-28 | End: 2024-05-28

## 2024-05-28 RX ORDER — TRIAMCINOLONE ACETONIDE 1 MG/G
1 OINTMENT TOPICAL 2 TIMES DAILY
Qty: 30 G | Refills: 1 | Status: SHIPPED | OUTPATIENT
Start: 2024-05-28

## 2024-05-28 NOTE — PROGRESS NOTES
Date: 2024   Patient Name: Gisella Mora  : 1985   MRN: [id number]     Chief Complaint:    Chief Complaint   Patient presents with    Rash     Both elbows and hands started yesterday.       History of Present Illness: Gisella Mora is a 39 y.o. female who is here today for BL rash on elbows and hands that stared on yesterday  Overall just feeling run down,   [place] but same sunscreen she usually uses. Rash is no where else she used sunscreen  No others with rash  No other associated symptoms.    Rash      Review of Systems:   Review of Systems   Skin:  Positive for rash.       Past Medical History:   Past Medical History:   Diagnosis Date    Anxiety     Binge eating     Depression     Numbness and tingling in hands     ONLY WITH SVT    Supraventricular tachycardia        Past Surgical History:   Past Surgical History:   Procedure Laterality Date    CARDIAC ELECTROPHYSIOLOGY PROCEDURE N/A 2017    Procedure: Ablation SVT;  Surgeon: Joby Fuentes MD;  Location: [facility] INVASIVE LOCATION;  Service:     TUBAL ABDOMINAL LIGATION      WISDOM TOOTH EXTRACTION         Family History:   Family History   Problem Relation Age of Onset    No Known Problems Mother     No Known Problems Father        Social History:   Social History     Socioeconomic History    Marital status:    Tobacco Use    Smoking status: Former     Current packs/day: 0.00     Average packs/day: 0.5 packs/day for 3.0 years (1.5 ttl pk-yrs)     Types: Cigarettes     Start date: 2005     Quit date: 2008     Years since quittin.4    Smokeless tobacco: Never   Vaping Use    Vaping status: Never Used   Substance and Sexual Activity    Alcohol use: Yes     Comment: Socially     Drug use: Never    Sexual activity: Not Currently     Birth control/protection: Abstinence       Medications:     Current Outpatient Medications:     predniSONE (DELTASONE) 10 MG (21) dose pack, Use  "as directed on package, Disp: 21 tablet, Rfl: 0    Semaglutide-Weight Management (Wegovy) 0.25 MG/0.5ML solution auto-injector, Inject 0.5 mL under the skin into the appropriate area as directed 1 (One) Time Per Week., Disp: 2 mL, Rfl: 0    sertraline (Zoloft) 100 MG tablet, , Disp: , Rfl:     triamcinolone (KENALOG) 0.1 % ointment, Apply 1 Application topically to the appropriate area as directed 2 (Two) Times a Day., Disp: 30 g, Rfl: 1    Allergies:   No Known Allergies      Physical Exam:  Vital Signs:   Vitals:    05/28/24 1205   BP: 110/72   Pulse: 78   Resp: 14   Temp: 97.5 °F (36.4 °C)   SpO2: 99%   Weight: 88.3 kg (194 lb 9.6 oz)   Height: 170.2 cm (67\")     Body mass index is 30.48 kg/m².     Physical Exam  Vitals and nursing note reviewed.   Constitutional:       Appearance: Normal appearance.   HENT:      Head: Normocephalic and atraumatic.   Cardiovascular:      Rate and Rhythm: Normal rate and regular rhythm.   Pulmonary:      Effort: Pulmonary effort is normal.      Breath sounds: Normal breath sounds.   Abdominal:      General: Bowel sounds are normal.   Skin:     General: Skin is warm.      Comments: Please refer to picture   Neurological:      General: No focal deficit present.      Mental Status: She is alert and oriented to person, place, and time.   Psychiatric:         Mood and Affect: Mood normal.               Assessment/Plan:   Diagnoses and all orders for this visit:    1. Rash (Primary)  -     POCT rapid strep A    2. Photosensitivity dermatitis  -     Discontinue: predniSONE (DELTASONE) 10 MG (21) dose pack; Use as directed on package  Dispense: 21 tablet; Refill: 0  -     Discontinue: triamcinolone (KENALOG) 0.1 % ointment; Apply 1 Application topically to the appropriate area as directed 2 (Two) Times a Day.  Dispense: 30 g; Refill: 1  -     predniSONE (DELTASONE) 10 MG (21) dose pack; Use as directed on package  Dispense: 21 tablet; Refill: 0  -     triamcinolone (KENALOG) 0.1 % " ointment; Apply 1 Application topically to the appropriate area as directed 2 (Two) Times a Day.  Dispense: 30 g; Refill: 1       Treating with prednisone and steroid cream  Monitor for worsening symptoms  If no improvement in 2 weeks, f/u and labs will be completed  Go to the ER with shortness of breath, chest pains, fever not controlled by medication, such as tylenol or ibuprofen     Follow Up:   Return if symptoms worsen or fail to improve, for Next scheduled follow up.    Akanksha Knowles. YEN   [facility]

## 2024-06-07 ENCOUNTER — OFFICE VISIT (OUTPATIENT)
Dept: FAMILY MEDICINE CLINIC | Facility: CLINIC | Age: 39
End: 2024-06-07
Payer: COMMERCIAL

## 2024-06-07 VITALS
RESPIRATION RATE: 16 BRPM | SYSTOLIC BLOOD PRESSURE: 122 MMHG | DIASTOLIC BLOOD PRESSURE: 68 MMHG | TEMPERATURE: 97.7 F | HEART RATE: 90 BPM | OXYGEN SATURATION: 98 % | BODY MASS INDEX: 31.16 KG/M2 | HEIGHT: 67 IN | WEIGHT: 198.5 LBS

## 2024-06-07 DIAGNOSIS — L29.9 ITCHING OF BOTH HANDS: ICD-10-CM

## 2024-06-07 DIAGNOSIS — R21 RASH OF BOTH HANDS: ICD-10-CM

## 2024-06-07 DIAGNOSIS — L30.1 DYSHYDROSIS: Primary | ICD-10-CM

## 2024-06-07 PROCEDURE — 99214 OFFICE O/P EST MOD 30 MIN: CPT | Performed by: NURSE PRACTITIONER

## 2024-06-07 RX ORDER — PREDNISONE 20 MG/1
20 TABLET ORAL 2 TIMES DAILY
Qty: 6 TABLET | Refills: 0 | Status: SHIPPED | OUTPATIENT
Start: 2024-06-07 | End: 2024-06-07

## 2024-06-07 RX ORDER — PREDNISONE 20 MG/1
20 TABLET ORAL 2 TIMES DAILY
Qty: 6 TABLET | Refills: 0 | Status: SHIPPED | OUTPATIENT
Start: 2024-06-07 | End: 2024-06-10

## 2024-06-07 RX ORDER — MOMETASONE FUROATE 1 MG/G
1 CREAM TOPICAL DAILY
Qty: 45 G | Refills: 0 | Status: SHIPPED | OUTPATIENT
Start: 2024-06-07 | End: 2024-06-07

## 2024-06-07 RX ORDER — MOMETASONE FUROATE 1 MG/G
1 CREAM TOPICAL DAILY
Qty: 45 G | Refills: 0 | Status: SHIPPED | OUTPATIENT
Start: 2024-06-07

## 2024-06-07 NOTE — PROGRESS NOTES
Date: 2024   Patient Name: Gisella Mora  : 1985   MRN: [id number]     Chief Complaint:    Chief Complaint   Patient presents with    Rash     Still has rash on hands. Prednisone helped while she was taking it.        History of Present Illness: Gisella Mora is a 39 y.o. female who is here today for Rash still present on hands, improved with steroids but came back after the use of steroids. Rash first started on 24, it was on BL elbows and hand, specifically on fingers, no palm of hands. Elbows have cleared up but hand rash is still present BL. She denies any new sex partners. No family history of autoimmune diseases. Steroid cream is not aiding in itching.     Rash      Review of Systems:   Review of Systems   Skin:  Positive for rash.       Past Medical History:   Past Medical History:   Diagnosis Date    Anxiety     Binge eating     Depression     Numbness and tingling in hands     ONLY WITH SVT    Supraventricular tachycardia        Past Surgical History:   Past Surgical History:   Procedure Laterality Date    CARDIAC ELECTROPHYSIOLOGY PROCEDURE N/A 2017    Procedure: Ablation SVT;  Surgeon: Joby Fuentes MD;  Location: [facility] INVASIVE LOCATION;  Service:     TUBAL ABDOMINAL LIGATION      WISDOM TOOTH EXTRACTION         Family History:   Family History   Problem Relation Age of Onset    No Known Problems Mother     No Known Problems Father        Social History:   Social History     Socioeconomic History    Marital status:    Tobacco Use    Smoking status: Former     Current packs/day: 0.00     Average packs/day: 0.5 packs/day for 3.0 years (1.5 ttl pk-yrs)     Types: Cigarettes     Start date: 2005     Quit date: 2008     Years since quittin.4    Smokeless tobacco: Never   Vaping Use    Vaping status: Never Used   Substance and Sexual Activity    Alcohol use: Yes     Comment: Socially     Drug use: Never    Sexual activity: Not Currently     Birth  "control/protection: Abstinence       Medications:     Current Outpatient Medications:     mometasone (ELOCON) 0.1 % cream, Apply 1 Application topically to the appropriate area as directed Daily., Disp: 45 g, Rfl: 0    predniSONE (DELTASONE) 20 MG tablet, Take 1 tablet by mouth 2 (Two) Times a Day for 3 days., Disp: 6 tablet, Rfl: 0    Semaglutide-Weight Management (Wegovy) 0.25 MG/0.5ML solution auto-injector, Inject 0.5 mL under the skin into the appropriate area as directed 1 (One) Time Per Week., Disp: 2 mL, Rfl: 0    sertraline (Zoloft) 100 MG tablet, , Disp: , Rfl:     triamcinolone (KENALOG) 0.1 % ointment, Apply 1 Application topically to the appropriate area as directed 2 (Two) Times a Day., Disp: 30 g, Rfl: 1    Allergies:   No Known Allergies      Physical Exam:  Vital Signs:   Vitals:    06/07/24 1447   BP: 122/68   Pulse: 90   Resp: 16   Temp: 97.7 °F (36.5 °C)   SpO2: 98%   Weight: 90 kg (198 lb 8 oz)   Height: 170.2 cm (67\")     Body mass index is 31.09 kg/m².     Physical Exam  Vitals and nursing note reviewed.   Constitutional:       Appearance: Normal appearance.   HENT:      Head: Normocephalic and atraumatic.   Cardiovascular:      Rate and Rhythm: Normal rate and regular rhythm.   Pulmonary:      Effort: Pulmonary effort is normal.      Breath sounds: Normal breath sounds.   Abdominal:      General: Bowel sounds are normal.   Skin:     General: Skin is warm.      Comments: Please refer to picture   Neurological:      General: No focal deficit present.      Mental Status: She is alert and oriented to person, place, and time.   Psychiatric:         Mood and Affect: Mood normal.               Assessment/Plan:   Diagnoses and all orders for this visit:    1. Dyshydrosis (Primary)  -     TUNG by IFA, Reflex 9-biomarkers profile  -     C3 Complement  -     ANCA Panel  -     C4 Complement  -     C-reactive Protein  -     Sedimentation Rate  -     Uric Acid  -     Rheumatoid Factor  -     HIV-1 / O / 2 " Ag / Antibody  -     T Pallidum Antibody w/ reflex RPR  -     Discontinue: mometasone (ELOCON) 0.1 % cream; Apply 1 Application topically to the appropriate area as directed Daily.  Dispense: 45 g; Refill: 0  -     Discontinue: predniSONE (DELTASONE) 20 MG tablet; Take 1 tablet by mouth 2 (Two) Times a Day for 3 days.  Dispense: 6 tablet; Refill: 0  -     predniSONE (DELTASONE) 20 MG tablet; Take 1 tablet by mouth 2 (Two) Times a Day for 3 days.  Dispense: 6 tablet; Refill: 0  -     mometasone (ELOCON) 0.1 % cream; Apply 1 Application topically to the appropriate area as directed Daily.  Dispense: 45 g; Refill: 0    2. Rash of both hands  -     TUNG by IFA, Reflex 9-biomarkers profile  -     C3 Complement  -     ANCA Panel  -     C4 Complement  -     C-reactive Protein  -     Sedimentation Rate  -     Uric Acid  -     Rheumatoid Factor  -     HIV-1 / O / 2 Ag / Antibody  -     T Pallidum Antibody w/ reflex RPR  -     Discontinue: mometasone (ELOCON) 0.1 % cream; Apply 1 Application topically to the appropriate area as directed Daily.  Dispense: 45 g; Refill: 0  -     mometasone (ELOCON) 0.1 % cream; Apply 1 Application topically to the appropriate area as directed Daily.  Dispense: 45 g; Refill: 0    3. Itching of both hands  -     TUNG by IFA, Reflex 9-biomarkers profile  -     C3 Complement  -     ANCA Panel  -     C4 Complement  -     C-reactive Protein  -     Sedimentation Rate  -     Uric Acid  -     Rheumatoid Factor  -     HIV-1 / O / 2 Ag / Antibody  -     T Pallidum Antibody w/ reflex RPR  -     Discontinue: mometasone (ELOCON) 0.1 % cream; Apply 1 Application topically to the appropriate area as directed Daily.  Dispense: 45 g; Refill: 0  -     mometasone (ELOCON) 0.1 % cream; Apply 1 Application topically to the appropriate area as directed Daily.  Dispense: 45 g; Refill: 0       Treating with prednisone and steroid cream  Monitor for worsening symptoms  Labs will be completed today  Go to the ER with  shortness of breath, chest pains, fever not controlled by medication, such as tylenol or ibuprofen     Follow Up:   Return if symptoms worsen or fail to improve.    Akanksha Knowles. YEN   [facility]

## 2024-06-12 LAB
ANA SER QL IF: POSITIVE
ANA SPECKLED TITR SER: ABNORMAL {TITER}
C-ANCA TITR SER IF: NORMAL TITER
C3 SERPL-MCNC: 165 MG/DL (ref 82–167)
C4 SERPL-MCNC: 22 MG/DL (ref 12–38)
CENTROMERE B AB SER-ACNC: <0.2 AI (ref 0–0.9)
CHROMATIN AB SERPL-ACNC: 0.2 AI (ref 0–0.9)
CRP SERPL-MCNC: 9 MG/L (ref 0–10)
DSDNA AB SER-ACNC: 2 IU/ML (ref 0–9)
ENA JO1 AB SER-ACNC: <0.2 AI (ref 0–0.9)
ENA RNP AB SER-ACNC: <0.2 AI (ref 0–0.9)
ENA SCL70 AB SER-ACNC: <0.2 AI (ref 0–0.9)
ENA SM AB SER-ACNC: <0.2 AI (ref 0–0.9)
ENA SS-A AB SER-ACNC: <0.2 AI (ref 0–0.9)
ENA SS-B AB SER-ACNC: <0.2 AI (ref 0–0.9)
ERYTHROCYTE [SEDIMENTATION RATE] IN BLOOD BY WESTERGREN METHOD: 16 MM/HR (ref 0–32)
HIV 1+2 AB+HIV1 P24 AG SERPL QL IA: NON REACTIVE
LABORATORY COMMENT REPORT: ABNORMAL
Lab: ABNORMAL
Lab: ABNORMAL
MYELOPEROXIDASE AB SER IA-ACNC: <0.2 UNITS (ref 0–0.9)
P-ANCA ATYPICAL TITR SER IF: NORMAL TITER
P-ANCA TITR SER IF: NORMAL TITER
PROTEINASE3 AB SER IA-ACNC: <0.2 UNITS (ref 0–0.9)
RHEUMATOID FACT SERPL-ACNC: 28.9 IU/ML
T PALLIDUM AB SER QL IF: NON REACTIVE
URATE SERPL-MCNC: 3.2 MG/DL (ref 2.6–6.2)

## 2024-06-13 ENCOUNTER — OFFICE VISIT (OUTPATIENT)
Dept: BEHAVIORAL HEALTH | Facility: CLINIC | Age: 39
End: 2024-06-13
Payer: COMMERCIAL

## 2024-06-13 VITALS
HEIGHT: 67 IN | SYSTOLIC BLOOD PRESSURE: 122 MMHG | BODY MASS INDEX: 31.08 KG/M2 | DIASTOLIC BLOOD PRESSURE: 68 MMHG | WEIGHT: 198 LBS

## 2024-06-13 DIAGNOSIS — R63.2 BINGE EATING: ICD-10-CM

## 2024-06-13 DIAGNOSIS — R76.8 POSITIVE ANA (ANTINUCLEAR ANTIBODY): Primary | ICD-10-CM

## 2024-06-13 DIAGNOSIS — R76.8 RHEUMATOID FACTOR POSITIVE: ICD-10-CM

## 2024-06-13 DIAGNOSIS — F90.2 ADHD (ATTENTION DEFICIT HYPERACTIVITY DISORDER), COMBINED TYPE: Primary | ICD-10-CM

## 2024-06-13 RX ORDER — LISDEXAMFETAMINE DIMESYLATE 40 MG/1
40 CAPSULE ORAL EVERY MORNING
Qty: 30 CAPSULE | Refills: 0 | Status: SHIPPED | OUTPATIENT
Start: 2024-06-13
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
## 2024-06-14 DIAGNOSIS — R63.8 UNABLE TO LOSE WEIGHT: ICD-10-CM

## 2024-06-14 DIAGNOSIS — E66.9 OBESITY (BMI 30-39.9): ICD-10-CM

## 2024-06-14 RX ORDER — SEMAGLUTIDE 0.25 MG/.5ML
INJECTION, SOLUTION SUBCUTANEOUS
Qty: 4 ML | Refills: 0 | OUTPATIENT
Start: 2024-06-14

## 2024-06-14 RX ORDER — SEMAGLUTIDE 0.5 MG/.5ML
0.5 INJECTION, SOLUTION SUBCUTANEOUS WEEKLY
Qty: 2 ML | Refills: 0 | Status: SHIPPED | OUTPATIENT
Start: 2024-06-14

## 2024-06-21 RX ORDER — ONDANSETRON 4 MG/1
4 TABLET, ORALLY DISINTEGRATING ORAL EVERY 8 HOURS PRN
Qty: 20 TABLET | Refills: 0 | Status: SHIPPED | OUTPATIENT
Start: 2024-06-21

## 2024-07-11 RX ORDER — SEMAGLUTIDE 0.5 MG/.5ML
0.5 INJECTION, SOLUTION SUBCUTANEOUS WEEKLY
Qty: 2 ML | Refills: 0 | Status: SHIPPED | OUTPATIENT
Start: 2024-07-11

## 2024-07-23 ENCOUNTER — TELEPHONE (OUTPATIENT)
Dept: BEHAVIORAL HEALTH | Facility: CLINIC | Age: 39
End: 2024-07-23

## 2024-07-23 ENCOUNTER — LAB (OUTPATIENT)
Dept: FAMILY MEDICINE CLINIC | Facility: CLINIC | Age: 39
End: 2024-07-23
Payer: COMMERCIAL

## 2024-07-23 ENCOUNTER — OFFICE VISIT (OUTPATIENT)
Dept: BEHAVIORAL HEALTH | Facility: CLINIC | Age: 39
End: 2024-07-23
Payer: COMMERCIAL

## 2024-07-23 VITALS
SYSTOLIC BLOOD PRESSURE: 128 MMHG | HEART RATE: 75 BPM | BODY MASS INDEX: 28.09 KG/M2 | DIASTOLIC BLOOD PRESSURE: 72 MMHG | HEIGHT: 67 IN | WEIGHT: 179 LBS

## 2024-07-23 DIAGNOSIS — F90.2 ADHD (ATTENTION DEFICIT HYPERACTIVITY DISORDER), COMBINED TYPE: ICD-10-CM

## 2024-07-23 DIAGNOSIS — F41.9 ANXIETY: Primary | ICD-10-CM

## 2024-07-23 DIAGNOSIS — R63.2 BINGE EATING: ICD-10-CM

## 2024-07-23 PROCEDURE — 99214 OFFICE O/P EST MOD 30 MIN: CPT

## 2024-07-23 RX ORDER — LISDEXAMFETAMINE DIMESYLATE 60 MG/1
60 CAPSULE ORAL EVERY MORNING
Qty: 30 CAPSULE | Refills: 0 | Status: SHIPPED | OUTPATIENT
Start: 2024-07-23

## 2024-07-23 RX ORDER — LISDEXAMFETAMINE DIMESYLATE 60 MG/1
60 CAPSULE ORAL EVERY MORNING
Qty: 30 CAPSULE | Refills: 0 | Status: SHIPPED | OUTPATIENT
Start: 2024-07-23 | End: 2024-07-23 | Stop reason: SDUPTHER

## 2024-07-23 NOTE — PROGRESS NOTES
Office  Follow Up Visit      Patient Name: Gisella Mora  : 1985   MRN: [id number]     Referring Provider: Becky Watkins DO    Chief Complaint: Medication follow-up    ICD-10-CM ICD-9-CM   1. Anxiety  F41.9 300.00   2. ADHD (attention deficit hyperactivity disorder), combined type  F90.2 314.01   3. Binge eating  R63.2 783.6        History of Present Illness:   Gisella Mora is a 39 y.o. female who is here today for follow up related to medication management of ADHD, anxiety, and binge eating.  Current medication regimen includes lisdexamfetamine 40 mg and sertraline 100 mg daily.    Patient reports things been going well since initiating lisdexamfetamine last month.  She reports that she has seen an improvement in her mood, increased motivation, as well as a decrease in agitation from excessive stimulus.  She reports that she has seen a slight increase in her focus but still reports difficulties with concentration and distractibility.  Patient reports that she has initiated semaglutide as well and is monitoring her daily caloric intake.  She reports that she is experiencing no adverse effects and feels things are moving in the right direction.  She reports an overall improvement in her mood as well as decreased anxiety.  She also reports that she did schedule appointment with [facility] and will be seeing her next week.    Patient also reports that she has seen a significant decrease in her food cravings and binging episodes.  She reports that normally in the evenings she would engage in overeating, but no longer is the case.  Impulse control has improved.      Subjective      Review of Systems:   Review of Systems   Constitutional: Negative.    Respiratory: Negative.     Cardiovascular: Negative.    Gastrointestinal: Negative.    Genitourinary: Negative.    Musculoskeletal: Negative.    Neurological: Negative.    Psychiatric/Behavioral:  Positive for decreased concentration.        Patient History:  "  The following portions of the patient's history were reviewed and updated as appropriate: allergies, current medications, past family history, past medical history, past social history, past surgical history and problem list.     Social:  No change in interval history.    Medications:     Current Outpatient Medications:     lisdexamfetamine (Vyvanse) 60 MG capsule, Take 1 capsule by mouth Every Morning, Disp: 30 capsule, Rfl: 0    ondansetron ODT (ZOFRAN-ODT) 4 MG disintegrating tablet, Place 1 tablet on the tongue Every 8 (Eight) Hours As Needed for Nausea or Vomiting., Disp: 20 tablet, Rfl: 0    Semaglutide-Weight Management (Wegovy) 0.5 MG/0.5ML solution auto-injector, Inject 0.5 mL under the skin into the appropriate area as directed 1 (One) Time Per Week., Disp: 2 mL, Rfl: 0    sertraline (Zoloft) 100 MG tablet, , Disp: , Rfl:     Objective     Physical Exam:  Vital Signs:   Vitals:    07/23/24 0845   BP: 128/72   Pulse: 75   Weight: 81.2 kg (179 lb)   Height: 170.2 cm (67\")     Body mass index is 28.04 kg/m².     Mental Status Exam:   Hygiene:   good  Cooperation:  Cooperative  Eye Contact:  Good  Psychomotor Behavior:  Appropriate  Affect:  Appropriate  Mood: normal  Speech:  Normal  Thought Process:  Goal directed  Thought Content:  Mood congruent  Suicidal:  None  Homicidal:  None  Hallucinations:  None  Delusion:  None  Memory:  Intact  Orientation:  Grossly intact  Reliability:  good  Insight:  Good  Judgement:  Good  Impulse Control:  Fair  Physical/Medical Issues:  No      Assessment / Plan      Visit Diagnosis/Orders Placed This Visit:  Diagnoses and all orders for this visit:    1. Anxiety (Primary)    2. ADHD (attention deficit hyperactivity disorder), combined type  -     lisdexamfetamine (Vyvanse) 60 MG capsule; Take 1 capsule by mouth Every Morning  Dispense: 30 capsule; Refill: 0    3. Binge eating  -     lisdexamfetamine (Vyvanse) 60 MG capsule; Take 1 capsule by mouth Every Morning  Dispense: " 30 capsule; Refill: 0         Differential:   N/A    Plan:   Increase lisdexamfetamine to 60 mg daily.  Continue Zoloft 100 mg daily.  Therapy with LCSW as scheduled.  Continue to monitor weight, blood pressure, and daily caloric intake.  Encouraged engaging in physical exercise.    Continue supportive psychotherapy efforts and medications as indicated. Treatment and medication options discussed during today's visit. Patient ackowledged and verbally consented to continue with current treatment plan and was educated on the importance of compliance with treatment and follow-up appointments. Patient seems reasonably able to adhere to treatment plan.      Medication Considerations:  Discussed medication options and treatment plan of prescribed medication(s) as well as the risks, benefits, and potential side effects. Patient is agreeable to call the office with any worsening of symptoms or onset of side effects. Patient is agreeable to call 911 or go to the nearest ER should he/she begin having SI/HI.    Quality Measures:   Former smoker    I advised Gisella Mora of the risks of tobacco use.     Follow Up:   Return in about 1 month (around 8/23/2024) for Recheck.      YEN Bolden, PMHNP-BC   Abdominal Pain, N/V/D

## 2024-08-12 ENCOUNTER — OFFICE VISIT (OUTPATIENT)
Dept: FAMILY MEDICINE CLINIC | Facility: CLINIC | Age: 39
End: 2024-08-12
Payer: COMMERCIAL

## 2024-08-12 VITALS
TEMPERATURE: 97.3 F | HEIGHT: 67 IN | OXYGEN SATURATION: 97 % | DIASTOLIC BLOOD PRESSURE: 58 MMHG | SYSTOLIC BLOOD PRESSURE: 104 MMHG | WEIGHT: 177.6 LBS | RESPIRATION RATE: 16 BRPM | HEART RATE: 80 BPM | BODY MASS INDEX: 27.88 KG/M2

## 2024-08-12 DIAGNOSIS — R63.8 UNABLE TO LOSE WEIGHT: Primary | ICD-10-CM

## 2024-08-12 DIAGNOSIS — E66.3 OVERWEIGHT (BMI 25.0-29.9): ICD-10-CM

## 2024-08-12 DIAGNOSIS — R11.0 NAUSEA: ICD-10-CM

## 2024-08-12 PROCEDURE — 99213 OFFICE O/P EST LOW 20 MIN: CPT | Performed by: FAMILY MEDICINE

## 2024-08-12 RX ORDER — ONDANSETRON 4 MG/1
4 TABLET, ORALLY DISINTEGRATING ORAL EVERY 8 HOURS PRN
Qty: 20 TABLET | Refills: 1 | Status: SHIPPED | OUTPATIENT
Start: 2024-08-12

## 2024-08-12 RX ORDER — SEMAGLUTIDE 0.5 MG/.5ML
0.5 INJECTION, SOLUTION SUBCUTANEOUS WEEKLY
Qty: 2 ML | Refills: 2 | Status: SHIPPED | OUTPATIENT
Start: 2024-08-12

## 2024-08-12 NOTE — PROGRESS NOTES
Chief Complaint  3 mo f/u    Kalyn Mora presents to [facility]    History of Present Illness  The patient presents for follow-up.    She is currently on Wegovy, which she reports is effective. She experiences occasional hiccups and nausea a few days after the injection, which are manageable. She finds the medication appetite-suppressing, leading to feeling unwell late in the day and reduced food intake. However, she finds the medication effective during the week while at work. She makes an effort to consume protein shakes and water regularly. She experienced bloating and fullness yesterday after dining out.. She takes a daily vitamin and fiber supplement. Her first injection of Wegovy was on 06/06/2024, and she experienced a rapid weight loss of 2 to 3 pounds per week in the first few weeks. However, her weight has remained stable at 1 pound per week since then. She decided to maintain the 0.5 mg dose, as she believes losing at least 1 pound per week is a healthy weight loss. She is hesitant to increase the dosage due to potential suppression or side effects. Her goal is to lose an additional 20 to 30 pounds. She takes her injection on Thursday evening before bed, as it aids her sleep. She also experiences occasional headaches and fatigue, which are manageable.    Supplemental Information  She is seeing behavioral health.   She was started on Vyvanse by YEN Bolden, which has helped a lot. Her overall mental health is doing better.      The following portions of the patient's history were reviewed and updated as appropriate: allergies, current medications, past family history, past medical history, past social history, past surgical history, and problem list.    Objective      Physical Exam  Vitals reviewed.   Constitutional:       Appearance: Normal appearance.   Pulmonary:      Effort: Pulmonary effort is normal. No respiratory distress.   Neurological:       Mental Status: She is alert.        Physical Exam      Result Review :       Results               Assessment and Plan    Diagnoses and all orders for this visit:    1. Unable to lose weight (Primary)  -     Semaglutide-Weight Management (Wegovy) 0.5 MG/0.5ML solution auto-injector; Inject 0.5 mL under the skin into the appropriate area as directed 1 (One) Time Per Week.  Dispense: 2 mL; Refill: 2    2. Overweight (BMI 25.0-29.9)  -     Semaglutide-Weight Management (Wegovy) 0.5 MG/0.5ML solution auto-injector; Inject 0.5 mL under the skin into the appropriate area as directed 1 (One) Time Per Week.  Dispense: 2 mL; Refill: 2    3. Nausea  -     ondansetron ODT (ZOFRAN-ODT) 4 MG disintegrating tablet; Place 1 tablet on the tongue Every 8 (Eight) Hours As Needed for Nausea or Vomiting.  Dispense: 20 tablet; Refill: 1    4. BMI 27.0-27.9,adult  -     Semaglutide-Weight Management (Wegovy) 0.5 MG/0.5ML solution auto-injector; Inject 0.5 mL under the skin into the appropriate area as directed 1 (One) Time Per Week.  Dispense: 2 mL; Refill: 2      Assessment & Plan  Continue Wegovy, will increase dose as she tolerates.   She has been successful so far with weight loss attempts and lifestyle improvements.     Follow-up  A follow-up visit is scheduled in 3 to 4 months.        Follow Up   Return in about 3 months (around 11/12/2024) for Recheck.    Patient or patient representative verbalized consent for the use of Ambient Listening during the visit with  Becky Watkins DO for chart documentation. 8/13/2024  13:52 EDT  Patient was given instructions and counseling regarding her condition or for health maintenance advice. Please see specific information pulled into the AVS if appropriate.

## 2024-08-20 RX ORDER — SEMAGLUTIDE 1 MG/.5ML
1 INJECTION, SOLUTION SUBCUTANEOUS WEEKLY
Qty: 2 ML | Refills: 0 | Status: SHIPPED | OUTPATIENT
Start: 2024-08-20

## 2024-08-27 ENCOUNTER — OFFICE VISIT (OUTPATIENT)
Dept: BEHAVIORAL HEALTH | Facility: CLINIC | Age: 39
End: 2024-08-27
Payer: COMMERCIAL

## 2024-08-27 VITALS
SYSTOLIC BLOOD PRESSURE: 104 MMHG | WEIGHT: 177 LBS | HEIGHT: 67 IN | BODY MASS INDEX: 27.78 KG/M2 | DIASTOLIC BLOOD PRESSURE: 58 MMHG

## 2024-08-27 DIAGNOSIS — R63.2 BINGE EATING: ICD-10-CM

## 2024-08-27 DIAGNOSIS — F90.2 ADHD (ATTENTION DEFICIT HYPERACTIVITY DISORDER), COMBINED TYPE: Primary | ICD-10-CM

## 2024-08-27 PROCEDURE — 99214 OFFICE O/P EST MOD 30 MIN: CPT

## 2024-08-27 RX ORDER — LISDEXAMFETAMINE DIMESYLATE 60 MG/1
60 CAPSULE ORAL EVERY MORNING
Qty: 30 CAPSULE | Refills: 0 | Status: SHIPPED | OUTPATIENT
Start: 2024-08-27

## 2024-08-27 NOTE — PROGRESS NOTES
Office  Follow Up Visit      Patient Name: Gisella Mora  : 1985   MRN: [id number]     Referring Provider: Becky Watkins DO    Chief Complaint: Medication follow-up    ICD-10-CM ICD-9-CM   1. ADHD (attention deficit hyperactivity disorder), combined type  F90.2 314.01   2. Binge eating  R63.2 783.6        History of Present Illness:   Gisella Mora is a 39 y.o. female who is here today for follow up related to case management ADHD and binge eating.  Current medication regimen includes Vyvanse 60 mg.  Also on sertraline 100 mg for previously diagnosed anxiety.    The patient reports that she has seen some improvement with her symptoms but is unsure if she is seeing any significant change from 40 to 60 mg.  However, she does report continued situational stress related to her current relationship with her ex-.  She reports that she does not feel the relationship is worth trying to salvage at this time and is concerned about how to move forward at this time.    She reports that she will skip taking the medication on the weekends.  Affirms that she does seem some more productivity at work.  No reported adverse effects at this time.    She also affirms an upcoming appointment LCSW.      Subjective      Review of Systems:   Review of Systems   Constitutional: Negative.    Respiratory: Negative.     Cardiovascular: Negative.    Gastrointestinal: Negative.    Genitourinary: Negative.    Musculoskeletal: Negative.    Neurological: Negative.    Psychiatric/Behavioral: Negative.         Patient History:   The following portions of the patient's history were reviewed and updated as appropriate: allergies, current medications, past family history, past medical history, past social history, past surgical history and problem list.     Social:  No change interval history.    Medications:     Current Outpatient Medications:     lisdexamfetamine (Vyvanse) 60 MG capsule, Take 1 capsule by mouth Every Morning,  "Disp: 30 capsule, Rfl: 0    ondansetron ODT (ZOFRAN-ODT) 4 MG disintegrating tablet, Place 1 tablet on the tongue Every 8 (Eight) Hours As Needed for Nausea or Vomiting., Disp: 20 tablet, Rfl: 1    Semaglutide-Weight Management (Wegovy) 1 MG/0.5ML solution auto-injector, Inject 0.5 mL under the skin into the appropriate area as directed 1 (One) Time Per Week., Disp: 2 mL, Rfl: 0    sertraline (Zoloft) 100 MG tablet, , Disp: , Rfl:     Objective     Physical Exam:  Vital Signs:   Vitals:    08/27/24 1632   BP: 104/58   Weight: 80.3 kg (177 lb)   Height: 170.2 cm (67\")     Body mass index is 27.72 kg/m².     Mental Status Exam:   Hygiene:   good  Cooperation:  Cooperative  Eye Contact:  Good  Psychomotor Behavior:  Appropriate  Affect:  Appropriate  Mood: normal  Speech:  Normal  Thought Process:  Circum  Thought Content:  Mood congruent  Suicidal:  None  Homicidal:  None  Hallucinations:  None  Delusion:  None  Memory:  Intact  Orientation:  Grossly intact  Reliability:  good  Insight:  Fair  Judgement:  Fair  Impulse Control:  Fair  Physical/Medical Issues:  No      Assessment / Plan      Visit Diagnosis/Orders Placed This Visit:  Diagnoses and all orders for this visit:    1. ADHD (attention deficit hyperactivity disorder), combined type (Primary)  -     lisdexamfetamine (Vyvanse) 60 MG capsule; Take 1 capsule by mouth Every Morning  Dispense: 30 capsule; Refill: 0    2. Binge eating  -     lisdexamfetamine (Vyvanse) 60 MG capsule; Take 1 capsule by mouth Every Morning  Dispense: 30 capsule; Refill: 0         Differential:   N/A    Plan:   Continue current medication regimen.  Psychotherapy with LCSW as scheduled.  Discussed behavioral modifications, short-term and long-term goal setting, open communication with ex-, and setting a plan to move forward with whatever decision she decides.    Continue supportive psychotherapy efforts and medications as indicated. Treatment and medication options discussed " during today's visit. Patient ackowledged and verbally consented to continue with current treatment plan and was educated on the importance of compliance with treatment and follow-up appointments. Patient seems reasonably able to adhere to treatment plan.      Medication Considerations:  Discussed medication options and treatment plan of prescribed medication(s) as well as the risks, benefits, and potential side effects. Patient is agreeable to call the office with any worsening of symptoms or onset of side effects. Patient is agreeable to call 911 or go to the nearest ER should he/she begin having SI/HI.    Quality Measures:   Former smoker    I advised Gisella Mora of the risks of tobacco use.     Follow Up:   Return in about 1 month (around 9/27/2024) for Recheck.      YEN Bolden, PMBARBARAP-BC    Answers submitted by the patient for this visit:  Other (Submitted on 8/27/2024)  Please describe your symptoms.: Medicine re-check  Have you had these symptoms before?: Yes  How long have you been having these symptoms?: Greater than 2 weeks  Primary Reason for Visit (Submitted on 8/27/2024)  What is the primary reason for your visit?: Other

## 2024-09-10 ENCOUNTER — OFFICE VISIT (OUTPATIENT)
Dept: BEHAVIORAL HEALTH | Facility: CLINIC | Age: 39
End: 2024-09-10
Payer: COMMERCIAL

## 2024-09-10 DIAGNOSIS — F43.23 ADJUSTMENT DISORDER WITH MIXED ANXIETY AND DEPRESSED MOOD: Primary | ICD-10-CM

## 2024-09-10 PROCEDURE — 90791 PSYCH DIAGNOSTIC EVALUATION: CPT | Performed by: SOCIAL WORKER
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
## 2024-09-10 NOTE — TREATMENT PLAN
Multi-Disciplinary Problems (from Behavioral Health Treatment Plan)      Active Problems       Problem: Assessment/EAP  Start Date: 09/10/24      Problem Details: Patient will attend outpatient psychotherapy and medication management follow-up appointments as scheduled and take medication as directed.          Goal Priority Start Date Expected End Date End Date    Patient will utilize appropriate treatment services. -- 09/10/24 03/11/25 --    Goal Details: Progress toward goal: Initial treatment plan            Goal Intervention Frequency Start Date End Date    Assist patient in developing a plan of action PRN 09/10/24 --    Intervention Details: Duration of treatment: Until remission of symptoms                  Problem: Adjustment  Start Date: 09/10/24      Problem Details: The patient self-scales this problem as a 6 out of 10; with 10 being the worst.          Goal Priority Start Date Expected End Date End Date    Patient will implement healthy coping strategies. -- 09/10/24 03/11/25 --    Goal Details: Progress toward goal: Initial treatment plan          Goal Intervention Frequency Start Date End Date    Assist patient to identify and develop healthy coping strategies PRN 09/10/24 --    Intervention Details: Duration of treatment: Until remission of symptoms                            Therapist reviewed and discussed treatment plan with patient in session.

## 2024-09-17 DIAGNOSIS — E66.3 OVERWEIGHT (BMI 25.0-29.9): Primary | ICD-10-CM

## 2024-09-17 RX ORDER — SEMAGLUTIDE 1 MG/.5ML
1 INJECTION, SOLUTION SUBCUTANEOUS WEEKLY
Qty: 2 ML | Refills: 0 | Status: SHIPPED | OUTPATIENT
Start: 2024-09-17

## 2024-10-03 ENCOUNTER — OFFICE VISIT (OUTPATIENT)
Dept: BEHAVIORAL HEALTH | Facility: CLINIC | Age: 39
End: 2024-10-03
Payer: COMMERCIAL

## 2024-10-03 VITALS
DIASTOLIC BLOOD PRESSURE: 77 MMHG | SYSTOLIC BLOOD PRESSURE: 107 MMHG | HEART RATE: 75 BPM | HEIGHT: 67 IN | WEIGHT: 169 LBS | BODY MASS INDEX: 26.53 KG/M2

## 2024-10-03 DIAGNOSIS — F90.2 ADHD (ATTENTION DEFICIT HYPERACTIVITY DISORDER), COMBINED TYPE: Primary | ICD-10-CM

## 2024-10-03 DIAGNOSIS — R63.2 BINGE EATING: ICD-10-CM

## 2024-10-03 RX ORDER — LISDEXAMFETAMINE DIMESYLATE 60 MG/1
60 CAPSULE ORAL EVERY MORNING
Qty: 30 CAPSULE | Refills: 0 | Status: SHIPPED | OUTPATIENT
Start: 2024-10-03

## 2024-10-03 NOTE — PROGRESS NOTES
Office  Follow Up Visit      Patient Name: Gisella Mora  : 1985   MRN: [id number]     Referring Provider: Becky Watkins DO    Chief Complaint: Medication follow-up    ICD-10-CM ICD-9-CM   1. ADHD (attention deficit hyperactivity disorder), combined type  F90.2 314.01   2. Binge eating  R63.2 783.6        History of Present Illness:   Gisella Mora is a 39 y.o. female who is here today for follow up related to ADHD and binge eating.  Current medication regimen includes lisdexamfetamine 60 mg every morning.    Patient reports satisfactory ADHD symptom management the current stimulant regimen.  Reports no adverse effects.  She reports continued use of Wegovy which has suppressed some appetite.  No issues with sleep reported.  Patient also reports that she has decided to end her relationship with her ex- and is trying to move forward at this point in her life.  She reports some experience grief.  Patient reports no other concerns at this time and is satisfied with her current plan of care.      Subjective      Review of Systems:   Review of Systems   Constitutional: Negative.    Respiratory: Negative.     Cardiovascular: Negative.    Gastrointestinal: Negative.    Genitourinary: Negative.    Musculoskeletal: Negative.    Psychiatric/Behavioral: Negative.          Patient History:   The following portions of the patient's history were reviewed and updated as appropriate: allergies, current medications, past family history, past medical history, past social history, past surgical history and problem list.     Social:  No change in interval history.    Medications:     Current Outpatient Medications:     lisdexamfetamine (Vyvanse) 60 MG capsule, Take 1 capsule by mouth Every Morning, Disp: 30 capsule, Rfl: 0    ondansetron ODT (ZOFRAN-ODT) 4 MG disintegrating tablet, Place 1 tablet on the tongue Every 8 (Eight) Hours As Needed for Nausea or Vomiting., Disp: 20 tablet, Rfl: 1    Semaglutide-Weight  "Management (Wegovy) 1 MG/0.5ML solution auto-injector, Inject 0.5 mL under the skin into the appropriate area as directed 1 (One) Time Per Week., Disp: 2 mL, Rfl: 0    sertraline (Zoloft) 100 MG tablet, , Disp: , Rfl:     Objective     Physical Exam:  Vital Signs:   Vitals:    10/03/24 1009   BP: 107/77   Pulse: 75   Weight: 76.7 kg (169 lb)   Height: 170.2 cm (67\")     Body mass index is 26.47 kg/m².     Mental Status Exam:   Hygiene:   good  Cooperation:  Cooperative  Eye Contact:  Good  Psychomotor Behavior:  Appropriate  Affect:  Appropriate  Mood: normal  Speech:  Normal  Thought Process:  Goal directed  Thought Content:  Mood congruent  Suicidal:  None  Homicidal:  None  Hallucinations:  None  Delusion:  None  Memory:  Intact  Orientation:  Grossly intact  Reliability:  good  Insight:  Good  Judgement:  Good  Impulse Control:  Good  Physical/Medical Issues:  No      Assessment / Plan      Visit Diagnosis/Orders Placed This Visit:  Diagnoses and all orders for this visit:    1. ADHD (attention deficit hyperactivity disorder), combined type (Primary)  -     lisdexamfetamine (Vyvanse) 60 MG capsule; Take 1 capsule by mouth Every Morning  Dispense: 30 capsule; Refill: 0    2. Binge eating  -     lisdexamfetamine (Vyvanse) 60 MG capsule; Take 1 capsule by mouth Every Morning  Dispense: 30 capsule; Refill: 0         Differential:   N/A    Plan:   Stay the course.  Continue current plan of care.    Continue supportive psychotherapy efforts and medications as indicated. Treatment and medication options discussed during today's visit. Patient ackowledged and verbally consented to continue with current treatment plan and was educated on the importance of compliance with treatment and follow-up appointments. Patient seems reasonably able to adhere to treatment plan.      Medication Considerations:  Discussed medication options and treatment plan of prescribed medication(s) as well as the risks, benefits, and potential " side effects. Patient is agreeable to call the office with any worsening of symptoms or onset of side effects. Patient is agreeable to call 911 or go to the nearest ER should he/she begin having SI/HI.    Quality Measures:   Former smoker    I advised Gisella Mora of the risks of tobacco use.     Follow Up:   Return in about 3 months (around 1/3/2025) for Recheck.      YEN Bolden, PMHNP-BC

## 2024-10-24 RX ORDER — SEMAGLUTIDE 1.7 MG/.75ML
1.7 INJECTION, SOLUTION SUBCUTANEOUS WEEKLY
Qty: 3 ML | Refills: 0 | Status: SHIPPED | OUTPATIENT
Start: 2024-10-24

## 2024-11-08 ENCOUNTER — OFFICE VISIT (OUTPATIENT)
Dept: BEHAVIORAL HEALTH | Facility: CLINIC | Age: 39
End: 2024-11-08
Payer: COMMERCIAL

## 2024-11-08 DIAGNOSIS — F43.23 ADJUSTMENT DISORDER WITH MIXED ANXIETY AND DEPRESSED MOOD: Primary | ICD-10-CM

## 2024-11-08 NOTE — PROGRESS NOTES
[facility]                  Follow Up Adult      Follow Up Adult Note     Date:2024   Patient Name: Gisella Mora  : 1985   MRN: [id number]   Time IN: 2:45 PM    Time OUT: 3:30 PM     Referring Provider: Becky Watkins DO    Chief Complaint:      ICD-10-CM ICD-9-CM   1. Adjustment disorder with mixed anxiety and depressed mood  F43.23 309.28      History of Present Illness:   Gisella Mora is a 39 y.o. female who is being seen today for follow up individual Psychotherapy session.      Subjective     Patient's Support Network Includes:  parents    Functional Status: Moderate impairment     Progress toward goal: Not at goal    Prognosis: Good with Ongoing Treatment     Medications:     Current Outpatient Medications:     lisdexamfetamine (Vyvanse) 60 MG capsule, Take 1 capsule by mouth Every Morning, Disp: 30 capsule, Rfl: 0    ondansetron ODT (ZOFRAN-ODT) 4 MG disintegrating tablet, Place 1 tablet on the tongue Every 8 (Eight) Hours As Needed for Nausea or Vomiting., Disp: 20 tablet, Rfl: 1    Semaglutide-Weight Management (Wegovy) 1.7 MG/0.75ML solution auto-injector, Inject 0.75 mL under the skin into the appropriate area as directed 1 (One) Time Per Week., Disp: 3 mL, Rfl: 0    sertraline (Zoloft) 100 MG tablet, , Disp: , Rfl:     Allergies:   No Known Allergies    Objective     Mental Status Exam:   Hygiene:   good  Cooperation:  Cooperative  Eye Contact:  Good  Psychomotor Behavior:  Appropriate  Affect:  Full range  Mood: anxious  Speech:  Normal  Thought Process:  Goal directed and Linear  Thought Content:  Mood congruent  Suicidal:  None  Homicidal:  None  Hallucinations:  None  Delusion:  None  Memory:  Intact  Orientation:  Person, Place, Time, and Situation  Reliability:  good  Insight:  Good  Judgement:  Good  Impulse Control:  Good  Physical/Medical Issues:   None reported at this time      Assessment / Plan      Visit  Diagnosis/Orders Placed This Visit:    ICD-10-CM ICD-9-CM   1. Adjustment disorder with mixed anxiety and depressed mood  F43.23 309.28      PLAN:  Safety: No acute safety concerns  Risk Assessment: Risk of self-harm acutely is low. Risk of self-harm chronically is also low, but could be further elevated in the event of treatment noncompliance and/or AODA.    Patient met with the undersigned on this day in office for individual psychotherapy session. Therapist facilitated patient exploration of the impact of symptoms and daily life stressors upon current thoughts, feelings, behavior, and interpersonal relationships in session and offered support and validation of patient's emotional experience. Therapist and patient discussed cognitive restructuring/reframing for emotional regulation at this time. Patient denied any current suicidal or homicidal ideation. She further denied any death wishes or auditory/visual hallucinations; oriented to person, place, time, and situation. Patient will continue outpatient psychotherapy as scheduled.    Treatment Plan/Goals: Continue supportive psychotherapy efforts and medications as indicated. Treatment and medication options discussed during today's visit. Patient ackowledged and verbally consented to continue with current treatment plan and was educated on the importance of compliance with treatment and follow-up appointments. Patient seems reasonably able to adhere to treatment plan.      Assisted Patient in processing above session content; acknowledged and normalized patient’s thoughts, feelings, and concerns.  Rationalized patient thought process regarding symptoms and daily life stressors.      Allowed Patient to freely discuss issues  without interruption or judgement with unconditional positive regard, active listening skills, and empathy. Therapist provided a safe, confidential environment to facilitate the development of a positive therapeutic relationship and encouraged  open, honest communication. Assisted Patient in processing session content; acknowledged and normalized Patient’s thoughts, feelings, and concerns by utilizing a person-centered approach in efforts to build appropriate rapport and a positive therapeutic relationship with open and honest communication.      Quality Measures:     TOBACCO USE:  Former smoker    Follow Up:   Return in about 2 weeks (around 11/22/2024) for Psychotherapy Follow-Up.      Clarissa Foster LCSW

## 2024-11-15 ENCOUNTER — OFFICE VISIT (OUTPATIENT)
Dept: FAMILY MEDICINE CLINIC | Facility: CLINIC | Age: 39
End: 2024-11-15
Payer: COMMERCIAL

## 2024-11-15 ENCOUNTER — HOSPITAL ENCOUNTER (OUTPATIENT)
Facility: HOSPITAL | Age: 39
Discharge: HOME | End: 2024-11-15
Admitting: FAMILY MEDICINE
Payer: COMMERCIAL

## 2024-11-15 VITALS
OXYGEN SATURATION: 98 % | HEIGHT: 67 IN | HEART RATE: 80 BPM | WEIGHT: 170.4 LBS | SYSTOLIC BLOOD PRESSURE: 124 MMHG | BODY MASS INDEX: 26.74 KG/M2 | TEMPERATURE: 97.5 F | DIASTOLIC BLOOD PRESSURE: 70 MMHG | RESPIRATION RATE: 18 BRPM

## 2024-11-15 DIAGNOSIS — R63.2 BINGE EATING: ICD-10-CM

## 2024-11-15 DIAGNOSIS — F90.2 ADHD (ATTENTION DEFICIT HYPERACTIVITY DISORDER), COMBINED TYPE: ICD-10-CM

## 2024-11-15 DIAGNOSIS — R10.31 RLQ ABDOMINAL PAIN: ICD-10-CM

## 2024-11-15 DIAGNOSIS — Z71.3 WEIGHT LOSS COUNSELING, ENCOUNTER FOR: ICD-10-CM

## 2024-11-15 DIAGNOSIS — R11.0 NAUSEA: ICD-10-CM

## 2024-11-15 DIAGNOSIS — E66.3 OVERWEIGHT (BMI 25.0-29.9): ICD-10-CM

## 2024-11-15 DIAGNOSIS — R10.9 FLANK PAIN: Primary | ICD-10-CM

## 2024-11-15 LAB
BILIRUB BLD-MCNC: NEGATIVE MG/DL
CLARITY, POC: CLEAR
COLOR UR: YELLOW
GLUCOSE UR STRIP-MCNC: NEGATIVE MG/DL
KETONES UR QL: NEGATIVE
LEUKOCYTE EST, POC: NEGATIVE
NITRITE UR-MCNC: NEGATIVE MG/ML
PH UR: 6 [PH] (ref 5–8)
PROT UR STRIP-MCNC: NEGATIVE MG/DL
RBC # UR STRIP: NEGATIVE /UL
SP GR UR: 1.01 (ref 1–1.03)
UROBILINOGEN UR QL: NORMAL

## 2024-11-15 PROCEDURE — 99214 OFFICE O/P EST MOD 30 MIN: CPT | Performed by: FAMILY MEDICINE

## 2024-11-15 PROCEDURE — 25510000001 IOPAMIDOL 61 % SOLUTION: Performed by: FAMILY MEDICINE

## 2024-11-15 PROCEDURE — 81003 URINALYSIS AUTO W/O SCOPE: CPT | Performed by: FAMILY MEDICINE

## 2024-11-15 PROCEDURE — 74177 CT ABD & PELVIS W/CONTRAST: CPT

## 2024-11-15 RX ORDER — LISDEXAMFETAMINE DIMESYLATE 60 MG/1
60 CAPSULE ORAL EVERY MORNING
Qty: 30 CAPSULE | Refills: 0 | Status: SHIPPED | OUTPATIENT
Start: 2024-11-15

## 2024-11-15 RX ORDER — DIATRIZOATE MEGLUMINE AND DIATRIZOATE SODIUM 660; 100 MG/ML; MG/ML
15 SOLUTION ORAL; RECTAL ONCE
Status: COMPLETED | OUTPATIENT
Start: 2024-11-15 | End: 2024-11-15

## 2024-11-15 RX ORDER — ONDANSETRON 4 MG/1
4 TABLET, ORALLY DISINTEGRATING ORAL EVERY 8 HOURS PRN
Qty: 20 TABLET | Refills: 1 | Status: SHIPPED | OUTPATIENT
Start: 2024-11-15

## 2024-11-15 RX ORDER — IOPAMIDOL 612 MG/ML
100 INJECTION, SOLUTION INTRAVASCULAR
Status: COMPLETED | OUTPATIENT
Start: 2024-11-15 | End: 2024-11-15

## 2024-11-15 RX ADMIN — IOPAMIDOL 100 ML: 612 INJECTION, SOLUTION INTRAVENOUS at 16:30

## 2024-11-15 RX ADMIN — DIATRIZOATE MEGLUMINE AND DIATRIZOATE SODIUM 15 ML: 660; 100 SOLUTION ORAL; RECTAL at 14:46

## 2024-11-15 NOTE — PROGRESS NOTES
Chief Complaint  3 mo f/u (Weight f/u) and Abdominal Pain (Lower back and lower abdominal pain, similar pain from when she had appendicitis. )    Subjective          Gisella Mora presents to [facility]    History of Present Illness  The patient presents for evaluation of multiple medical concerns.    She has been experiencing increased pain during her menstrual cycle and ovulation for the past several months, accompanied by lower back pain. She is scheduled with her gynecologist next Monday for an ultrasound.  She expresses concern about the possibility of an ovarian cyst or appendicitis.    She also reports discomfort and tenderness in her lower back, similar to the pain she experienced during a previous episode of appendicitis. She has a history of appendicitis in Feb 2024 and is worried about a recurrence, since it was not surgically removed. She reports tenderness around her belly button and to the sides of it.  She also mentions frequent urination, which she attributes to her high water intake.    She has a history of gastrointestinal issues, including appendicitis, but an endoscopy revealed no abnormalities in her esophagus or stomach. She maintains a healthy diet and drinks plenty of water. She has regular bowel movements every other day, but notes a change since starting her current medication. She has been taking  fiber supplements and stool softeners.     She is currently on a 1.7 mg dose of medication, which she started on Monday, and has previously taken 0.5 mg and 1 mg doses for two months each. She reports a decrease in appetite since starting the medication.      The following portions of the patient's history were reviewed and updated as appropriate: allergies, current medications, past family history, past medical history, past social history, past surgical history, and problem list.    Objective      Physical Exam  Vitals reviewed.   Constitutional:        Appearance: Normal appearance. She is not ill-appearing or toxic-appearing.   Pulmonary:      Effort: Pulmonary effort is normal. No respiratory distress.   Abdominal:      General: Bowel sounds are decreased.      Tenderness: There is abdominal tenderness in the right lower quadrant, periumbilical area and left lower quadrant.   Neurological:      Mental Status: She is alert.        Physical Exam      Result Review :       Results               Assessment and Plan    Diagnoses and all orders for this visit:    1. Flank pain (Primary)  -     POCT urinalysis dipstick, multipro    2. RLQ abdominal pain  -     CT Abdomen Pelvis With Contrast    3. Overweight (BMI 25.0-29.9)      Assessment & Plan  STAT CT abd pelvis to evaluate current symptome, r/o appendicitis.   She has an upcoming ultrasound scheduled with her gynecologist on Monday to evaluate the pelvic pain.     She has had success with Wegovy, losing weight gradually.   She is currently on a medication regimen with a dosage of 1.7 mg. She will continue this dosage for the next 4 weeks and would like to continue 1.7 mg as long as insurance will cover.         Follow Up   No follow-ups on file.    Patient or patient representative verbalized consent for the use of Ambient Listening during the visit with  Becky Watkins DO for chart documentation. 11/15/2024  13:13 EST  Patient was given instructions and counseling regarding her condition or for health maintenance advice. Please see specific information pulled into the AVS if appropriate.

## 2024-11-26 RX ORDER — SEMAGLUTIDE 1.7 MG/.75ML
1.7 INJECTION, SOLUTION SUBCUTANEOUS WEEKLY
Qty: 3 ML | Refills: 0 | Status: SHIPPED | OUTPATIENT
Start: 2024-11-26

## 2025-01-03 ENCOUNTER — OFFICE VISIT (OUTPATIENT)
Dept: BEHAVIORAL HEALTH | Facility: CLINIC | Age: 40
End: 2025-01-03
Payer: COMMERCIAL

## 2025-01-03 VITALS
SYSTOLIC BLOOD PRESSURE: 126 MMHG | BODY MASS INDEX: 25.43 KG/M2 | HEIGHT: 67 IN | DIASTOLIC BLOOD PRESSURE: 74 MMHG | HEART RATE: 78 BPM | WEIGHT: 162 LBS

## 2025-01-03 DIAGNOSIS — F90.2 ADHD (ATTENTION DEFICIT HYPERACTIVITY DISORDER), COMBINED TYPE: ICD-10-CM

## 2025-01-03 DIAGNOSIS — R63.2 BINGE EATING: ICD-10-CM

## 2025-01-03 RX ORDER — LISDEXAMFETAMINE DIMESYLATE 60 MG/1
60 CAPSULE ORAL EVERY MORNING
Qty: 30 CAPSULE | Refills: 0 | Status: SHIPPED | OUTPATIENT
Start: 2025-01-03

## 2025-01-03 NOTE — PROGRESS NOTES
Office  Follow Up Visit      Patient Name: Gisella Mora  : 1985   MRN: [id number]     Referring Provider: Becky Watkins DO    Chief Complaint: Yes    ICD-10-CM ICD-9-CM   1. ADHD (attention deficit hyperactivity disorder), combined type  F90.2 314.01   2. Binge eating  R63.2 783.6        History of Present Illness:   Gisella Mora is a 39 y.o. female who is here today for follow up related to medication management of ADHD and associated binging.  Medication regimen includes lisdexamfetamine 60 mg every morning.  History of anxiety and currently on sertraline 100 mg daily.    Patient reports things have been okay since her last follow-up.  She reports that she is unsure of the medication is at the right dose.  She reports at times she does still feel distracted, and struggles with her concentration.  However, she reports that work has been a lot slower and so she had less responsibility and tasks completed.  Patient reports in the evening time she still becomes overwhelmed related to excessive stimulus as well as uncompleted tasks at home.  She reports that work should  in the next few weeks.  She would like to see how she responds to the medications current dose when her work load is increased when she has to have more focus.  Patient currently on Wegovy 1.7 mg weekly.  Affirms she is maintaining adequate food intake.      Subjective      Review of Systems:   Review of Systems   Constitutional: Negative.    Respiratory: Negative.     Cardiovascular: Negative.    Gastrointestinal: Negative.    Genitourinary: Negative.    Musculoskeletal: Negative.    Psychiatric/Behavioral:  Positive for decreased concentration.        Patient History:   The following portions of the patient's history were reviewed and updated as appropriate: allergies, current medications, past family history, past medical history, past social history, past surgical history and problem list.     Social:  No change in  "interval history.    Medications:     Current Outpatient Medications:     lisdexamfetamine (Vyvanse) 60 MG capsule, Take 1 capsule by mouth Every Morning, Disp: 30 capsule, Rfl: 0    ondansetron ODT (ZOFRAN-ODT) 4 MG disintegrating tablet, Place 1 tablet on the tongue Every 8 (Eight) Hours As Needed for Nausea or Vomiting., Disp: 20 tablet, Rfl: 1    Semaglutide-Weight Management (Wegovy) 1.7 MG/0.75ML solution auto-injector, Inject 0.75 mL under the skin into the appropriate area as directed 1 (One) Time Per Week., Disp: 3 mL, Rfl: 0    sertraline (Zoloft) 100 MG tablet, , Disp: , Rfl:     Objective     Physical Exam:  Vital Signs:   Vitals:    01/03/25 0906   BP: 126/74   Pulse: 78   Weight: 73.5 kg (162 lb)   Height: 170.2 cm (67\")     Body mass index is 25.37 kg/m².     Mental Status Exam:   Hygiene:   good  Cooperation:  Cooperative  Eye Contact:  Good  Psychomotor Behavior:  Appropriate  Affect:  Appropriate  Mood: normal  Speech:  Normal  Thought Process:  Circum  Thought Content:  Mood congruent  Suicidal:  None  Homicidal:  None  Hallucinations:  None  Delusion:  None  Memory:  Intact  Orientation:  Grossly intact  Reliability:  good  Insight:  Fair  Judgement:  Fair  Impulse Control:  Fair  Physical/Medical Issues:  No      Assessment / Plan      Visit Diagnosis/Orders Placed This Visit:  Diagnoses and all orders for this visit:    1. ADHD (attention deficit hyperactivity disorder), combined type  -     lisdexamfetamine (Vyvanse) 60 MG capsule; Take 1 capsule by mouth Every Morning  Dispense: 30 capsule; Refill: 0    2. Binge eating  -     lisdexamfetamine (Vyvanse) 60 MG capsule; Take 1 capsule by mouth Every Morning  Dispense: 30 capsule; Refill: 0         Differential:   N/A    Plan:   Instructed patient to take medication vacations to see if she sees a difference in her symptoms with the medication versus not taking it.  Discussed behavioral modifications.  Continue psychotherapy with established " therapist as scheduled.  Discussed short-term and long-term goal setting.    Continue supportive psychotherapy efforts and medications as indicated. Treatment and medication options discussed during today's visit. Patient ackowledged and verbally consented to continue with current treatment plan and was educated on the importance of compliance with treatment and follow-up appointments. Patient seems reasonably able to adhere to treatment plan.      Medication Considerations:  Discussed medication options and treatment plan of prescribed medication(s) as well as the risks, benefits, and potential side effects. Patient is agreeable to call the office with any worsening of symptoms or onset of side effects. Patient is agreeable to call 911 or go to the nearest ER should he/she begin having SI/HI.    Quality Measures:   Former smoker    I advised Gisella Mora of the risks of tobacco use.     Follow Up:   Return in about 3 months (around 4/3/2025) for Recheck.      YEN Bolden, PMHNP-BC

## 2025-01-06 RX ORDER — SEMAGLUTIDE 2.4 MG/.75ML
2.4 INJECTION, SOLUTION SUBCUTANEOUS WEEKLY
Qty: 3 ML | Refills: 2 | Status: SHIPPED | OUTPATIENT
Start: 2025-01-06

## 2025-01-08 ENCOUNTER — PRIOR AUTHORIZATION (OUTPATIENT)
Dept: FAMILY MEDICINE CLINIC | Facility: CLINIC | Age: 40
End: 2025-01-08
Payer: COMMERCIAL

## 2025-01-08 NOTE — TELEPHONE ENCOUNTER
Wegovy PA (Key: [id number]) Approved  CaseId:[id number];Status:Approved;Review Type:Prior Auth;Coverage Start Date:12/09/2024;Coverage End Date:01/08/2026

## 2025-01-27 ENCOUNTER — OFFICE VISIT (OUTPATIENT)
Age: 40
End: 2025-01-27
Payer: COMMERCIAL

## 2025-01-27 ENCOUNTER — LAB (OUTPATIENT)
Facility: HOSPITAL | Age: 40
End: 2025-01-27
Payer: COMMERCIAL

## 2025-01-27 VITALS
WEIGHT: 161 LBS | HEIGHT: 67 IN | DIASTOLIC BLOOD PRESSURE: 76 MMHG | BODY MASS INDEX: 25.27 KG/M2 | SYSTOLIC BLOOD PRESSURE: 124 MMHG | TEMPERATURE: 97.2 F | HEART RATE: 78 BPM

## 2025-01-27 DIAGNOSIS — R76.8 RHEUMATOID FACTOR POSITIVE: ICD-10-CM

## 2025-01-27 DIAGNOSIS — R53.83 FATIGUE, UNSPECIFIED TYPE: ICD-10-CM

## 2025-01-27 DIAGNOSIS — R76.8 ANA POSITIVE: Primary | ICD-10-CM

## 2025-01-27 DIAGNOSIS — R76.8 ANA POSITIVE: ICD-10-CM

## 2025-01-27 LAB
BASOPHILS # BLD AUTO: 0.03 10*3/MM3 (ref 0–0.2)
BASOPHILS NFR BLD AUTO: 0.5 % (ref 0–1.5)
BILIRUB UR QL STRIP: NEGATIVE
CLARITY UR: ABNORMAL
COLOR UR: YELLOW
DEPRECATED RDW RBC AUTO: 38.1 FL (ref 37–54)
EOSINOPHIL # BLD AUTO: 0.04 10*3/MM3 (ref 0–0.4)
EOSINOPHIL NFR BLD AUTO: 0.7 % (ref 0.3–6.2)
ERYTHROCYTE [DISTWIDTH] IN BLOOD BY AUTOMATED COUNT: 12.6 % (ref 12.3–15.4)
ERYTHROCYTE [SEDIMENTATION RATE] IN BLOOD: 7 MM/HR (ref 0–20)
GLUCOSE UR STRIP-MCNC: NEGATIVE MG/DL
HCT VFR BLD AUTO: 41.3 % (ref 34–46.6)
HGB BLD-MCNC: 14.1 G/DL (ref 12–15.9)
HGB UR QL STRIP.AUTO: NEGATIVE
HOLD SPECIMEN: NORMAL
IMM GRANULOCYTES # BLD AUTO: 0.01 10*3/MM3 (ref 0–0.05)
IMM GRANULOCYTES NFR BLD AUTO: 0.2 % (ref 0–0.5)
KETONES UR QL STRIP: ABNORMAL
LEUKOCYTE ESTERASE UR QL STRIP.AUTO: NEGATIVE
LYMPHOCYTES # BLD AUTO: 2.16 10*3/MM3 (ref 0.7–3.1)
LYMPHOCYTES NFR BLD AUTO: 38.5 % (ref 19.6–45.3)
MCH RBC QN AUTO: 28.8 PG (ref 26.6–33)
MCHC RBC AUTO-ENTMCNC: 34.1 G/DL (ref 31.5–35.7)
MCV RBC AUTO: 84.3 FL (ref 79–97)
MONOCYTES # BLD AUTO: 0.41 10*3/MM3 (ref 0.1–0.9)
MONOCYTES NFR BLD AUTO: 7.3 % (ref 5–12)
NEUTROPHILS NFR BLD AUTO: 2.96 10*3/MM3 (ref 1.7–7)
NEUTROPHILS NFR BLD AUTO: 52.8 % (ref 42.7–76)
NITRITE UR QL STRIP: NEGATIVE
NRBC BLD AUTO-RTO: 0 /100 WBC (ref 0–0.2)
PH UR STRIP.AUTO: 6.5 [PH] (ref 5–8)
PLATELET # BLD AUTO: 271 10*3/MM3 (ref 140–450)
PMV BLD AUTO: 9 FL (ref 6–12)
PROT UR QL STRIP: NEGATIVE
RBC # BLD AUTO: 4.9 10*6/MM3 (ref 3.77–5.28)
SP GR UR STRIP: 1.02 (ref 1–1.03)
UROBILINOGEN UR QL STRIP: ABNORMAL
WBC NRBC COR # BLD AUTO: 5.61 10*3/MM3 (ref 3.4–10.8)

## 2025-01-27 PROCEDURE — 85610 PROTHROMBIN TIME: CPT

## 2025-01-27 PROCEDURE — 86431 RHEUMATOID FACTOR QUANT: CPT

## 2025-01-27 PROCEDURE — 85732 THROMBOPLASTIN TIME PARTIAL: CPT

## 2025-01-27 PROCEDURE — 99204 OFFICE O/P NEW MOD 45 MIN: CPT | Performed by: INTERNAL MEDICINE

## 2025-01-27 PROCEDURE — 86800 THYROGLOBULIN ANTIBODY: CPT

## 2025-01-27 PROCEDURE — 85730 THROMBOPLASTIN TIME PARTIAL: CPT

## 2025-01-27 PROCEDURE — 86038 ANTINUCLEAR ANTIBODIES: CPT

## 2025-01-27 PROCEDURE — 81374 HLA I TYPING 1 ANTIGEN LR: CPT

## 2025-01-27 PROCEDURE — 82085 ASSAY OF ALDOLASE: CPT

## 2025-01-27 PROCEDURE — 80050 GENERAL HEALTH PANEL: CPT

## 2025-01-27 PROCEDURE — 86147 CARDIOLIPIN ANTIBODY EA IG: CPT

## 2025-01-27 PROCEDURE — 86480 TB TEST CELL IMMUN MEASURE: CPT

## 2025-01-27 PROCEDURE — 86140 C-REACTIVE PROTEIN: CPT

## 2025-01-27 PROCEDURE — 86376 MICROSOMAL ANTIBODY EACH: CPT

## 2025-01-27 PROCEDURE — 82550 ASSAY OF CK (CPK): CPT

## 2025-01-27 PROCEDURE — 85670 THROMBIN TIME PLASMA: CPT

## 2025-01-27 PROCEDURE — 83520 IMMUNOASSAY QUANT NOS NONAB: CPT

## 2025-01-27 PROCEDURE — 85598 HEXAGNAL PHOSPH PLTLT NEUTRL: CPT

## 2025-01-27 PROCEDURE — 81003 URINALYSIS AUTO W/O SCOPE: CPT

## 2025-01-27 PROCEDURE — 84439 ASSAY OF FREE THYROXINE: CPT

## 2025-01-27 PROCEDURE — 85613 RUSSELL VIPER VENOM DILUTED: CPT

## 2025-01-27 PROCEDURE — 86146 BETA-2 GLYCOPROTEIN ANTIBODY: CPT

## 2025-01-27 PROCEDURE — 85652 RBC SED RATE AUTOMATED: CPT

## 2025-01-27 PROCEDURE — 80074 ACUTE HEPATITIS PANEL: CPT

## 2025-01-27 PROCEDURE — 36415 COLL VENOUS BLD VENIPUNCTURE: CPT

## 2025-01-27 NOTE — PROGRESS NOTES
Office Visit       Date: 01/27/2025   Patient Name: Gisella Mora  MRN: [id number]  YOB: 1985    Referring Physician: Akanksha Knowles APRN     Chief Complaint   Patient presents with    Abnormal Lab     TUNG +, RF +       History of Present Illness: Gisella Mora is a 39 y.o. female who is here today at the request of Akanksha BARLOW. The referral indicates that her TUNG and RF tests were positive. The labs were done after she had a rash on her elbows and hands. This rash has resolved and not returned.     No recent serious injuries or infections. No fever. She is fatigued. No history of gout, psoriasis, or Raynaud's. No oral, nasal, or genital ulcers. No shortness of breath or chest pain. No history of uveitis, iritis, or scleritis. No sicca symptoms. She has abdominal pain. She is constipated. She has seen GI specialists. She had a normal endoscopy by her account. She thought she might have irritable bowel syndrome.  No  issues. No rash at this time. No hair loss. She does not sleep well. No lymphadenopathy. No abnormal bruising/bleeding. No headaches or paresthesias.     No joint swelling. No muscle pain or weakness. No back or neck problems. No red or hot joints. She has 5 minutes/day of morning stiffness. She rates her pain today as 4/10 in severity.     She reports her mother has PMR.     She has had 2 miscarriages and 2 successful pregnancies.     Medication/treatment/interventions tried include: Tylenol, sertraline, she has seen [facility] Special[facility], She has seen GI specialists, she now limits/avoids oral NSAIDS due to GI issues, Oral steroids   Studies reviewed included:   6/7/24: TUNG 1:160 Speckled, DS DNA normal, RNP normal, Erickson normal, SCL 70 normal, SSA and SSB normal, Chromatin normal, Lani 1 normal, Centromere normal, C3 normal, ANCA tests normal, C4 normal, CRP normal, Sed rate normal, Uric acid 3.2, RF 28.9 (< 14), HIV negative, T. Pallidum negative      CT  ABDOMEN PELVIS W CONTRAST     Date of Exam: 11/15/2024 4:14 PM EST     Indication: RLQ pain.     Comparison: 2/29/2024     Technique: Axial CT images were obtained of the abdomen and pelvis following the uneventful intravenous administration of 100 cc Isovue-300 IV contrast . Reconstructed coronal and sagittal images were also obtained. Automated exposure control and   iterative construction methods were used.     FINDINGS:     Lung bases: No masses. No consolidation.     Liver:No masses. No intrahepatic biliary ductal dilatation.     Spleen:No masses. No perisplenic hematoma.     Pancreas:No pancreatic masses. No evidence of pancreatitis.     Gallbladder and common bile duct:No evidence of cholelithiasis. No evidence of cholecystitis.     Adrenal glands:No adrenal masses     Kidneys and ureters:No kidney stones. No renal masses.No calculi present within the ureters. Normal caliber ureters.     Urinary bladder:No urinary bladder wall thickening. No bladder masses.     Small bowel:Normal caliber small bowel.     Large bowel:No diverticulosis or diverticulitis. No large bowel masses are appreciated     Appendix: Normal     GENITOURINARY: Normal appearance of the uterus and adnexa.     Ascites or pneumoperitoneum:None.     Adenopathy:None present     Osseous structures: The proximal femurs are intact. The pubic bones are intact. The sacroiliac joints are normal. The bony pelvis is intact. No rib fractures. Lumbar vertebral body height and alignment are normal. The posterior elements are intact.     Other findings: None     IMPRESSION:  No acute abdominal or pelvic pathology. Normal appendix.      Subjective     Review of Systems   Constitutional:  Positive for fatigue.   HENT: Negative.     Eyes: Negative.    Respiratory: Negative.     Cardiovascular: Negative.    Gastrointestinal:  Positive for abdominal pain, constipation and indigestion.   Endocrine: Positive for cold intolerance and polydipsia.   Genitourinary:  Negative.    Musculoskeletal:  Positive for arthralgias.   Skin: Negative.    Allergic/Immunologic: Negative.    Neurological: Negative.    Hematological: Negative.    Psychiatric/Behavioral:  Positive for agitation, decreased concentration, self-injury, depressed mood and stress. The patient is nervous/anxious.    All other systems reviewed and are negative.       Past Medical History:   Diagnosis Date    Anxiety     Binge eating     Depression     Numbness and tingling in hands     ONLY WITH SVT    Supraventricular tachycardia        Past Surgical History:   Procedure Laterality Date    CARDIAC ELECTROPHYSIOLOGY PROCEDURE N/A 2017    Procedure: Ablation SVT;  Surgeon: Joby Fuentes MD;  Location: [facility] INVASIVE LOCATION;  Service:     TUBAL ABDOMINAL LIGATION      WISDOM TOOTH EXTRACTION         Family History   Problem Relation Age of Onset    No Known Problems Mother     No Known Problems Father        Social History     Socioeconomic History    Marital status:    Tobacco Use    Smoking status: Former     Current packs/day: 0.00     Average packs/day: 0.5 packs/day for 3.0 years (1.5 ttl pk-yrs)     Types: Cigarettes     Start date: 2005     Quit date: 2008     Years since quittin.0     Passive exposure: Past    Smokeless tobacco: Never   Vaping Use    Vaping status: Never Used   Substance and Sexual Activity    Alcohol use: Yes     Comment: Socially     Drug use: Never    Sexual activity: Not Currently     Birth control/protection: Abstinence         Current Outpatient Medications:     lisdexamfetamine (Vyvanse) 60 MG capsule, Take 1 capsule by mouth Every Morning, Disp: 30 capsule, Rfl: 0    ondansetron ODT (ZOFRAN-ODT) 4 MG disintegrating tablet, Place 1 tablet on the tongue Every 8 (Eight) Hours As Needed for Nausea or Vomiting., Disp: 20 tablet, Rfl: 1    Semaglutide-Weight Management (Wegovy) 2.4 MG/0.75ML solution auto-injector, Inject 2.4 mg under the skin into the  "appropriate area as directed 1 (One) Time Per Week., Disp: 3 mL, Rfl: 2    sertraline (Zoloft) 100 MG tablet, , Disp: , Rfl:     Allergies   Allergen Reactions    Clindamycin Itching and Rash       I reviewed the patient's chief complaint, history of present illness, review of systems, past medical history, surgical history, family history, social history, medications and allergy list.     Objective      Vitals:    01/27/25 0952   BP: 124/76   BP Location: Left arm   Patient Position: Sitting   Cuff Size: Adult   Pulse: 78   Temp: 97.2 °F (36.2 °C)   Weight: 73 kg (161 lb)   Height: 170.2 cm (67\")   PainSc:   4   PainLoc: Generalized     Body mass index is 25.22 kg/m².       Physical Exam     General: Well appearing 39 year old  female. Not in distress. She is ambulating unassisted.   SKIN: No rashes. No alopecia. No subcutaneous nodules. No digital pits or ulcers. No sclerodactyly. She has a tattoo.   HEENT: NCAT. Conjunctiva clear, no photophobia. No oral or nasal ulcers. Hearing intact.    Pulmonary: Clear to auscultation bilaterally. No wheezing, rales, or rhonchi.  CV: Regular rate and rhythm. No murmurs, rubs, or gallops.   Psych: Normal mood and affect. Alert and oriented x 3.   Extremities: No cyanosis or edema.   Musculoskeletal: No joint swelling or tenderness to palpation. No warmth or erythema. Normal range of motion of the wrists, ankles, elbows, and knees.   Lymph: No palpable cervical adenopathy        Procedures    Assessment / Plan      Assessment & Plan  TUNG positive  6/7/24: TUNG 1:160 Speckled, DS DNA normal, RNP normal, Erickson normal, SCL 70 normal, SSA and SSB normal, Chromatin normal, Lani 1 normal, Centromere normal, C3 normal, ANCA tests normal, C4 normal, CRP normal, Sed rate normal, Uric acid 3.2, RF 28.9 (< 14), HIV negative, T. Pallidum negative  Mother with PMR   Rash in Summer of 2024.   Medication/treatment/interventions tried include: Tylenol, sertraline, she has seen Behavior " Health Specialists, She has seen GI specialists, she now limits/avoids oral NSAIDS due to GI issues, Oral steroids   We reviewed the outside labs and discussed them at length. All of the patients questions were answered.   Handout on + TUNG tests was given to the patient to take home.   An TUNG test is a non specific test. While it certainly can be positive in conditions like SLE, scleroderma, myositis, Sjögren's, etc.. It can also be positive in patients with thyroid disease, type 1 diabetes, psoriasis, Celiacs disease, inflammatory bowel disease, etc.. There are also reports of normal/apparently healthy individuals who have been incidentally found to have a +TUNG test. You can also sometimes get a false positive TUNG test.   Labs today to try and determine the significance of this +TUNG test. Patient will be contacted once these test results are available.     Orders:    XR Hand 2 View Bilateral; Future    XR Foot 3+ View Bilateral; Future    XR Chest 2 View; Future    QuantiFERON-TB Gold Plus; Future    Hepatitis Panel, Acute; Future    14.3.3 ETA, Rheum. Arthritis; Future    TUNG 12 Plus Profile (RDL); Future    TUNG by IFA, Reflex to Titer and Pattern; Future    Beta-2 Glycoprotein Antibodies; Future    CBC Auto Differential; Future    CK; Future    Comprehensive Metabolic Panel; Future    Cyclic Citrul Peptide Antibody, IgG / IgA; Future    C-reactive Protein; Future    HLA-B27 Antigen; Future    Lupus Anticoag / Cardiolipin Ab; Future    RF Isotypes, IgG, IgA, IgM EIA; Future    Sedimentation Rate; Future    Thyroid Antibodies; Future    TSH+Free T4; Future    Urinalysis With Culture If Indicated -; Future    Aldolase; Future    Rheumatoid factor positive  1. Labs and x-rays today to evaluate for rheumatoid arthritis. We will contact the patient with these results once these are available.   2. We also need to check for viral hepatitis. Viral hepatitis can cause a false +RF test in some instances. If the patient  does have RA he or she will require immune suppression and we need to know if the patient  has a condition like chronic Hep B/C.   3. Also test Sjögren's antibodies. Patient's with Sjögren's disease often have +RF tests.   4. Handout on +RF tests given to the patient to take home and review.     Orders:    XR Hand 2 View Bilateral; Future    XR Foot 3+ View Bilateral; Future    XR Chest 2 View; Future    QuantiFERON-TB Gold Plus; Future    Hepatitis Panel, Acute; Future    14.3.3 ETA, Rheum. Arthritis; Future    TUNG 12 Plus Profile (RDL); Future    TUNG by IFA, Reflex to Titer and Pattern; Future    Beta-2 Glycoprotein Antibodies; Future    CBC Auto Differential; Future    CK; Future    Comprehensive Metabolic Panel; Future    Cyclic Citrul Peptide Antibody, IgG / IgA; Future    C-reactive Protein; Future    HLA-B27 Antigen; Future    Lupus Anticoag / Cardiolipin Ab; Future    RF Isotypes, IgG, IgA, IgM EIA; Future    Sedimentation Rate; Future    Thyroid Antibodies; Future    TSH+Free T4; Future    Urinalysis With Culture If Indicated -; Future    Aldolase; Future    Fatigue, unspecified type    Labs and x-rays to evaluate for autoimmune conditions. We usually contact patients with these results within 10-14 business days    Orders:    XR Hand 2 View Bilateral; Future    XR Foot 3+ View Bilateral; Future    XR Chest 2 View; Future    QuantiFERON-TB Gold Plus; Future    Hepatitis Panel, Acute; Future    14.3.3 ETA, Rheum. Arthritis; Future    TUNG 12 Plus Profile (RDL); Future    TUNG by IFA, Reflex to Titer and Pattern; Future    Beta-2 Glycoprotein Antibodies; Future    CBC Auto Differential; Future    CK; Future    Comprehensive Metabolic Panel; Future    Cyclic Citrul Peptide Antibody, IgG / IgA; Future    C-reactive Protein; Future    HLA-B27 Antigen; Future    Lupus Anticoag / Cardiolipin Ab; Future    RF Isotypes, IgG, IgA, IgM EIA; Future    Sedimentation Rate; Future    Thyroid Antibodies; Future    TSH+Free  T4; Future    Urinalysis With Culture If Indicated -; Future    Aldolase; Future      Follow Up:   Return in about 3 months (around 4/27/2025).    Erasto Jasmine DO  [facility]

## 2025-01-28 LAB
ALBUMIN SERPL-MCNC: 4.5 G/DL (ref 3.5–5.2)
ALBUMIN/GLOB SERPL: 1.4 G/DL
ALP SERPL-CCNC: 60 U/L (ref 39–117)
ALT SERPL W P-5'-P-CCNC: 16 U/L (ref 1–33)
ANION GAP SERPL CALCULATED.3IONS-SCNC: 11.1 MMOL/L (ref 5–15)
AST SERPL-CCNC: 15 U/L (ref 1–32)
BILIRUB SERPL-MCNC: 0.5 MG/DL (ref 0–1.2)
BUN SERPL-MCNC: 14 MG/DL (ref 6–20)
BUN/CREAT SERPL: 19.2 (ref 7–25)
CALCIUM SPEC-SCNC: 9.8 MG/DL (ref 8.6–10.5)
CHLORIDE SERPL-SCNC: 102 MMOL/L (ref 98–107)
CK SERPL-CCNC: 40 U/L (ref 20–180)
CO2 SERPL-SCNC: 23.9 MMOL/L (ref 22–29)
CREAT SERPL-MCNC: 0.73 MG/DL (ref 0.57–1)
CRP SERPL-MCNC: <0.3 MG/DL (ref 0–0.5)
EGFRCR SERPLBLD CKD-EPI 2021: 107.4 ML/MIN/1.73
GLOBULIN UR ELPH-MCNC: 3.3 GM/DL
GLUCOSE SERPL-MCNC: 82 MG/DL (ref 65–99)
HAV IGM SERPL QL IA: NORMAL
HBV CORE IGM SERPL QL IA: NORMAL
HBV SURFACE AG SERPL QL IA: NORMAL
HCV AB SER QL: NORMAL
POTASSIUM SERPL-SCNC: 3.8 MMOL/L (ref 3.5–5.2)
PROT SERPL-MCNC: 7.8 G/DL (ref 6–8.5)
SODIUM SERPL-SCNC: 137 MMOL/L (ref 136–145)
T4 FREE SERPL-MCNC: 1.31 NG/DL (ref 0.92–1.68)
TSH SERPL DL<=0.05 MIU/L-ACNC: 0.95 UIU/ML (ref 0.27–4.2)

## 2025-01-29 LAB — ALDOLASE SERPL-CCNC: 2.2 U/L (ref 3.3–10.3)

## 2025-01-30 LAB
GAMMA INTERFERON BACKGROUND BLD IA-ACNC: 0.03 IU/ML
M TB IFN-G BLD-IMP: NEGATIVE
M TB IFN-G CD4+ BCKGRND COR BLD-ACNC: 0.03 IU/ML
M TB IFN-G CD4+CD8+ BCKGRND COR BLD-ACNC: 0.03 IU/ML
MITOGEN IGNF BCKGRD COR BLD-ACNC: >10 IU/ML
QUANTIFERON INCUBATION: NORMAL
SERVICE CMNT-IMP: NORMAL
THYROGLOB AB SERPL-ACNC: <1 IU/ML (ref 0–0.9)
THYROPEROXIDASE AB SERPL-ACNC: <9 IU/ML (ref 0–34)

## 2025-01-31 LAB — HLA-B27 QL NAA+PROBE: NEGATIVE

## 2025-02-02 LAB — 14-3-3 ETA AG SER IA-MCNC: <0.2 NG/ML

## 2025-02-03 LAB
RF IGA SER-ACNC: <7 U
RF IGG SER-ACNC: <7 U
RF IGM SER IA-ACNC: <7 U

## 2025-02-13 LAB
APTT HEX PL PPP: 8 SEC
APTT IMM NP PPP: ABNORMAL SEC
APTT PPP 1:1 SALINE: ABNORMAL SEC
APTT PPP: 30.4 SEC
B2 GLYCOPROT1 IGA SER-ACNC: <10 SAU
B2 GLYCOPROT1 IGG SER-ACNC: <10 SGU
B2 GLYCOPROT1 IGM SER-ACNC: <10 SMU
CARDIOLIPIN IGG SER IA-ACNC: <10 GPL
CARDIOLIPIN IGM SER IA-ACNC: <10 MPL
CONFIRM DRVVT: ABNORMAL SEC
DRVVT SCREEN TO CONFIRM RATIO: ABNORMAL RATIO
INR PPP: 1.1 RATIO
LABORATORY COMMENT REPORT: ABNORMAL
PROTHROMBIN TIME: 12.5 SEC
SCREEN DRVVT: 45.8 SEC
THROMBIN TIME: 19.8 SEC

## 2025-02-14 LAB
ANA PLUS 12 INTERPRETATION: ABNORMAL
ANA SER QL IF: POSITIVE
ANA SPECKLED TITR SER: ABNORMAL {TITER}
C3 SERPL-MCNC: 172 MG/DL (ref 90–180)
C4 SERPL-MCNC: 24 MG/DL (ref 14–44)
CARDIOLIPIN IGA SER IA-ACNC: <12 APL U/ML
CARDIOLIPIN IGG SER IA-ACNC: <15 GPL U/ML
CARDIOLIPIN IGM SER IA-ACNC: <13 MPL U/ML
CCP IGA+IGG SERPL IA-ACNC: <20 UNITS
CENTROMERE AB TITR SER IF: ABNORMAL {TITER}
CHROMATIN IGG SERPL-ACNC: <20 UNITS
DSDNA AB SER FARR-ACNC: <8 IU/ML
ENA SCL70 AB SER IA-ACNC: <20 UNITS
ENA SM AB SER-ACNC: <20 UNITS
ENA SS-A AB SER IA-ACNC: <20 UNITS
ENA SS-B AB SER IA-ACNC: <20 UNITS
LABORATORY COMMENT REPORT: ABNORMAL
RHEUMATOID FACT SERPL-ACNC: <14 IU/ML
THYROPEROXIDASE AB SERPL-ACNC: <9 IU/ML
U1 SNRNP AB SER IA-ACNC: <20 UNITS

## 2025-03-14 ENCOUNTER — OFFICE VISIT (OUTPATIENT)
Dept: FAMILY MEDICINE CLINIC | Facility: CLINIC | Age: 40
End: 2025-03-14
Payer: COMMERCIAL

## 2025-03-14 VITALS
RESPIRATION RATE: 18 BRPM | OXYGEN SATURATION: 98 % | SYSTOLIC BLOOD PRESSURE: 104 MMHG | HEART RATE: 82 BPM | HEIGHT: 67 IN | TEMPERATURE: 97.8 F | BODY MASS INDEX: 25.18 KG/M2 | WEIGHT: 160.4 LBS | DIASTOLIC BLOOD PRESSURE: 64 MMHG

## 2025-03-14 DIAGNOSIS — N64.4 BREAST PAIN, LEFT: ICD-10-CM

## 2025-03-14 DIAGNOSIS — E66.3 OVERWEIGHT (BMI 25.0-29.9): ICD-10-CM

## 2025-03-14 DIAGNOSIS — Z71.3 WEIGHT LOSS COUNSELING, ENCOUNTER FOR: Primary | ICD-10-CM

## 2025-03-14 RX ORDER — SEMAGLUTIDE 2.4 MG/.75ML
2.4 INJECTION, SOLUTION SUBCUTANEOUS WEEKLY
Qty: 3 ML | Refills: 2 | Status: SHIPPED | OUTPATIENT
Start: 2025-03-14

## 2025-03-14 NOTE — PROGRESS NOTES
Chief Complaint  Weight f/u    Subjective          Gisella Mora presents to [facility]  History of Present Illness  Weight loss follow up  From her previous visit in November 2024, she has lost an additional 10 lbs.   Taking Wegovy 2.4 mg and tolerating without side effect. Starting weight was 193 lbs in May 2024  Plans to start walking for exercise.   Overall diet is healthy. Usually a protein shake in AM, prioritizes protein, fruits, and vegetables.     Left breast pain x 1 week   She does self breast exams. Denies skin changes or palpable mass  Tender to the touch  Doesn't recall injury to the area       The following portions of the patient's history were reviewed and updated as appropriate: allergies, current medications, past family history, past medical history, past social history, past surgical history, and problem list.    Objective      Physical Exam  Vitals reviewed.   Cardiovascular:      Rate and Rhythm: Normal rate and regular rhythm.      Heart sounds: Normal heart sounds.   Pulmonary:      Effort: Pulmonary effort is normal.      Breath sounds: Normal breath sounds.   Chest:   Breasts:     Right: No inverted nipple, mass, nipple discharge, skin change or tenderness.      Left: Tenderness present. No inverted nipple, mass, nipple discharge or skin change.       Musculoskeletal:      Cervical back: Neck supple.   Neurological:      Mental Status: She is alert.   Psychiatric:         Mood and Affect: Mood normal.        Result Review :                Assessment and Plan    Diagnoses and all orders for this visit:    1. Weight loss counseling, encounter for (Primary)  -     Semaglutide-Weight Management (Wegovy) 2.4 MG/0.75ML solution auto-injector; Inject 0.75 mL under the skin into the appropriate area as directed 1 (One) Time Per Week.  Dispense: 3 mL; Refill: 2    2. Overweight (BMI 25.0-29.9)  -     Semaglutide-Weight Management (Wegovy) 2.4 MG/0.75ML solution  auto-injector; Inject 0.75 mL under the skin into the appropriate area as directed 1 (One) Time Per Week.  Dispense: 3 mL; Refill: 2    3. Breast pain, left  -     Mammo Diagnostic Digital Tomosynthesis Bilateral With CAD; Future    4. BMI 25.0-25.9,adult    Continue weight loss efforts. Start routine exercise.   Follow up in 3-4 months.       Follow Up   Return in about 3 months (around 6/14/2025) for Recheck.  Patient was given instructions and counseling regarding her condition or for health maintenance advice. Please see specific information pulled into the AVS if appropriate.

## 2025-03-24 DIAGNOSIS — F90.2 ADHD (ATTENTION DEFICIT HYPERACTIVITY DISORDER), COMBINED TYPE: ICD-10-CM

## 2025-03-24 DIAGNOSIS — R63.2 BINGE EATING: ICD-10-CM

## 2025-03-24 DIAGNOSIS — R11.0 NAUSEA: ICD-10-CM

## 2025-03-24 RX ORDER — ONDANSETRON 4 MG/1
4 TABLET, ORALLY DISINTEGRATING ORAL EVERY 8 HOURS PRN
Qty: 20 TABLET | Refills: 1 | Status: SHIPPED | OUTPATIENT
Start: 2025-03-24

## 2025-03-25 RX ORDER — LISDEXAMFETAMINE DIMESYLATE 60 MG/1
60 CAPSULE ORAL EVERY MORNING
Qty: 30 CAPSULE | Refills: 0 | Status: SHIPPED | OUTPATIENT
Start: 2025-03-25

## 2025-04-02 LAB
NCCN CRITERIA FLAG: NORMAL
TYRER CUZICK SCORE: 18.7

## 2025-04-03 ENCOUNTER — HOSPITAL ENCOUNTER (OUTPATIENT)
Facility: HOSPITAL | Age: 40
Discharge: HOME | End: 2025-04-03
Payer: COMMERCIAL

## 2025-04-03 DIAGNOSIS — N64.4 BREAST PAIN, LEFT: ICD-10-CM

## 2025-04-03 PROCEDURE — 76642 ULTRASOUND BREAST LIMITED: CPT

## 2025-04-03 PROCEDURE — G0279 TOMOSYNTHESIS, MAMMO: HCPCS

## 2025-04-03 PROCEDURE — 77066 DX MAMMO INCL CAD BI: CPT

## 2025-05-08 ENCOUNTER — OFFICE VISIT (OUTPATIENT)
Dept: FAMILY MEDICINE CLINIC | Facility: CLINIC | Age: 40
End: 2025-05-08
Payer: COMMERCIAL

## 2025-05-08 VITALS
BODY MASS INDEX: 24.2 KG/M2 | DIASTOLIC BLOOD PRESSURE: 64 MMHG | HEART RATE: 81 BPM | RESPIRATION RATE: 14 BRPM | SYSTOLIC BLOOD PRESSURE: 110 MMHG | WEIGHT: 154.2 LBS | TEMPERATURE: 97.7 F | HEIGHT: 67 IN | OXYGEN SATURATION: 99 %

## 2025-05-08 DIAGNOSIS — F41.9 ANXIETY: Primary | ICD-10-CM

## 2025-05-08 DIAGNOSIS — Z71.3 WEIGHT LOSS COUNSELING, ENCOUNTER FOR: ICD-10-CM

## 2025-05-08 PROCEDURE — 99214 OFFICE O/P EST MOD 30 MIN: CPT | Performed by: FAMILY MEDICINE

## 2025-05-08 RX ORDER — ALPRAZOLAM 0.5 MG
.25-.5 TABLET ORAL 2 TIMES DAILY PRN
Qty: 20 TABLET | Refills: 0 | Status: SHIPPED | OUTPATIENT
Start: 2025-05-08

## 2025-05-08 RX ORDER — SEMAGLUTIDE 1.7 MG/.75ML
1.7 INJECTION, SOLUTION SUBCUTANEOUS WEEKLY
Qty: 3 ML | Refills: 2 | Status: SHIPPED | OUTPATIENT
Start: 2025-05-08

## 2025-05-08 NOTE — PROGRESS NOTES
Chief Complaint  Anxiety (She's going to [place] next week and her anxiety gets bad before a trip. She wants to add something temporary to help. )    Subjective          Gisella Mora presents to [facility]    History of Present Illness  The patient presents to discuss anxiety.    She has been managing her anxiety independently, supplementing with daily Zoloft. However, she experiences episodes of heightened anxiety when faced with situations beyond her comfort zone or during interactions with her ex-. These episodes are characterized by an inability to control or calm herself, leading to physical manifestations such as difficulty breathing, tremors, and gastrointestinal disturbances that can persist for several days. She is scheduled to travel to [employer] next week, marking her first extended separation from her daughters, and is seeking additional medication to manage her anxiety during this period. She expresses concern about potential side effects of new medications and their impact on her ability to function normally. She also reports a decrease in the frequency of panic attacks compared to her younger years but notes occasional episodes triggered by her daughters' travel with their father. She has previously used Valium once for a procedure but has not tried Xanax or Ativan. She is interested in trying a new medication prior to her flight to assess its effectiveness and potential side effects. She has not used BuSpar or hydroxyzine in the past.    She is currently on the highest approved dose of Wegovy, administered weekly, and has been experiencing severe side effects including nausea and food aversion. These side effects have escalated over the past 3 to 4 doses, now requiring 1 Zofran for relief. She reports that the severity of these side effects is comparable to her experience during pregnancy, with an inability to consume food due to persistent nausea. She is  considering reducing her maintenance dose to 1.7 or extending the interval between doses to every 10 days. She expresses concern about potential overuse of the medication and its impact on her weight loss goals. She is currently on Wegovy 2.4 mg weekly.      The following portions of the patient's history were reviewed and updated as appropriate: allergies, current medications, past family history, past medical history, past social history, past surgical history, and problem list.    Objective      Physical Exam  Vitals and nursing note reviewed.   Constitutional:       Appearance: Normal appearance.   Pulmonary:      Effort: Pulmonary effort is normal. No respiratory distress.   Neurological:      Mental Status: She is alert.   Psychiatric:         Mood and Affect: Mood normal.         Behavior: Behavior normal.        Physical Exam      Result Review :       Results               Assessment and Plan    Diagnoses and all orders for this visit:    1. Anxiety (Primary)  -     ALPRAZolam (XANAX) 0.5 MG tablet; Take 0.5-1 tablets by mouth 2 (Two) Times a Day As Needed for Anxiety.  Dispense: 20 tablet; Refill: 0    2. Weight loss counseling, encounter for  -     Semaglutide-Weight Management (Wegovy) 1.7 MG/0.75ML solution auto-injector; Inject 0.75 mL under the skin into the appropriate area as directed 1 (One) Time Per Week.  Dispense: 3 mL; Refill: 2      Assessment & Plan  1. Anxiety.  - Experiences significant anxiety, particularly in situations outside her comfort zone, leading to physical symptoms such as shaking and stomach issues.  - Currently taking Zoloft daily but requires additional support for situational anxiety.  - A prescription for Xanax 0.5 mg has been provided, with instructions to halve the dose initially to assess tolerance and effectiveness.  - Advised to use it situationally, particularly for her upcoming travel to [employer] [employer]. The potential for addiction and tolerance with frequent use was discussed.  ADA query complete. Treatment plan to include limited course of prescribed  controlled substance. Risks including addiction, benefits, and alternatives presented to patient.       2. Medication management.  - Currently on the highest dose of Wegovy (2.4 mg) but has been experiencing severe nausea and food aversion.  - A prescription for Wegovy 1.7 mg has been provided as a maintenance dose.  - If insurance does not cover the lower dose, she will try spacing out the 2.4 mg dose every 10 days to manage side effects better.          Follow Up   No follow-ups on file.    Patient or patient representative verbalized consent for the use of Ambient Listening during the visit with  Becky Watkins DO for chart documentation. 5/8/2025  19:13 EDT  Patient was given instructions and counseling regarding her condition or for health maintenance advice. Please see specific information pulled into the AVS if appropriate.

## 2025-06-16 ENCOUNTER — OFFICE VISIT (OUTPATIENT)
Dept: FAMILY MEDICINE CLINIC | Facility: CLINIC | Age: 40
End: 2025-06-16
Payer: COMMERCIAL

## 2025-06-16 VITALS
HEIGHT: 67 IN | SYSTOLIC BLOOD PRESSURE: 106 MMHG | OXYGEN SATURATION: 98 % | RESPIRATION RATE: 18 BRPM | HEART RATE: 64 BPM | DIASTOLIC BLOOD PRESSURE: 56 MMHG | TEMPERATURE: 97.7 F | BODY MASS INDEX: 23.89 KG/M2 | WEIGHT: 152.2 LBS

## 2025-06-16 DIAGNOSIS — F41.9 ANXIETY: Primary | ICD-10-CM

## 2025-06-16 DIAGNOSIS — Z71.3 WEIGHT LOSS COUNSELING, ENCOUNTER FOR: ICD-10-CM

## 2025-06-16 PROCEDURE — 99213 OFFICE O/P EST LOW 20 MIN: CPT | Performed by: FAMILY MEDICINE

## 2025-06-16 RX ORDER — SERTRALINE HYDROCHLORIDE 100 MG/1
100 TABLET, FILM COATED ORAL DAILY
Qty: 90 TABLET | Refills: 0 | Status: SHIPPED | OUTPATIENT
Start: 2025-06-16

## 2025-06-16 RX ORDER — SEMAGLUTIDE 1.7 MG/.75ML
1.7 INJECTION, SOLUTION SUBCUTANEOUS WEEKLY
Qty: 9 ML | Refills: 0 | Status: SHIPPED | OUTPATIENT
Start: 2025-06-16

## 2025-06-16 NOTE — PROGRESS NOTES
Chief Complaint  3 mo f/u    Kalyn Mora presents to [facility]    History of Present Illness  The patient presents for a follow-up visit.    She reports a significant improvement in her anxiety symptoms following the initiation of Xanax therapy. Initially, she experienced overwhelming anxiety and difficulty concentrating, which led to emotional distress. However, after taking Xanax, these symptoms subsided, allowing her to regain focus. She recalls an incident at work where she was able to manage her anxiety effectively with Xanax, without experiencing any drowsiness. She has been using Xanax as needed during periods of heightened stress or anxiety.    She is taking Zoloft and is considering discussing with her provider about the necessity of continuing this medication. She recalls a period after the birth of her first child when she discontinued all medications, but her obsessive and anxious behaviors resurfaced when her daughter was about 1 year old. She is currently contemplating whether a lower daily dose of medication, supplemented with Xanax as needed, might be sufficient. She missed a scheduled appointment with her provider due to an emergency involving her youngest child and plans to reschedule. She has a sufficient supply of Zoloft for the next few weeks.    She has not been taking Vyvanse regularly and does not feel a strong need for it, except when concerned about binge eating compulsions. She has noticed that taking Vyvanse early in the morning results in irritability and impatience in the evening, and taking it later in the day interferes with her sleep. She is currently evaluating the benefits versus side effects of Vyvanse.    She has reduced her Wegovy dose to 1.7, which she finds effective in managing her food cravings without causing nausea or other side effects. She is not overly concerned about further weight loss but would like to lose an  additional 5 to 10 pounds. She is planning to resume gym workouts and is aware that muscle gain may cause some weight fluctuation. She emphasizes the importance of maintaining a healthy diet and adequate hydration while on Wegovy.      The following portions of the patient's history were reviewed and updated as appropriate: allergies, current medications, past family history, past medical history, past social history, past surgical history, and problem list.    Objective      Physical Exam  Vitals and nursing note reviewed.   Pulmonary:      Effort: Pulmonary effort is normal. No respiratory distress.   Neurological:      Mental Status: She is alert.   Psychiatric:         Mood and Affect: Mood normal.         Behavior: Behavior normal.        Physical Exam      Result Review :       Results               Assessment and Plan    Diagnoses and all orders for this visit:    1. Anxiety (Primary)  -     sertraline (Zoloft) 100 MG tablet; Take 1 tablet by mouth Daily.  Dispense: 90 tablet; Refill: 0    2. Weight loss counseling, encounter for  -     Semaglutide-Weight Management (Wegovy) 1.7 MG/0.75ML solution auto-injector; Inject 0.75 mL under the skin into the appropriate area as directed 1 (One) Time Per Week.  Dispense: 9 mL; Refill: 0      Assessment & Plan  1. Anxiety.  - Reports significant improvement in anxiety symptoms with the use of Xanax (alprazolam) as needed.  - Continues using Xanax as needed for acute anxiety episodes.  - Prescription for sertraline has been renewed to ensure continuity of medication.  - Will discuss with her provider about potentially adjusting the dosage of sertraline.    2. Weight management.  - Currently on Wegovy (semaglutide) 1.7 mg for weight management and reports it is effective without significant side effects.  - Prescription for Wegovy has been renewed to ensure adequate supply.  - Continues with the current dosage and monitors weight and symptoms.  - Reports improved weight  and overall mental health, with plans to resume gym activities.        Follow Up   Return in about 4 months (around 10/16/2025) for Recheck in 3-4 months .    Patient or patient representative verbalized consent for the use of Ambient Listening during the visit with  Becky Watkins DO for chart documentation. 6/16/2025  10:08 EDT    Patient was given instructions and counseling regarding her condition or for health maintenance advice. Please see specific information pulled into the AVS if appropriate.

## 2025-07-26 ENCOUNTER — TELEPHONE (OUTPATIENT)
Dept: FAMILY MEDICINE CLINIC | Facility: TELEHEALTH | Age: 40
End: 2025-07-26
Payer: COMMERCIAL

## 2025-07-26 ENCOUNTER — E-VISIT (OUTPATIENT)
Dept: FAMILY MEDICINE CLINIC | Facility: TELEHEALTH | Age: 40
End: 2025-07-26
Payer: COMMERCIAL

## 2025-07-26 NOTE — E-VISIT ESCALATED
Date: 2025 11:08:11  Clinician: Ayana Alexandre  Clinician NPI: [id number]  Patient: Gisella Mora  Patient : 1985  Patient Address: [address] [place] [address]  Patient Phone: [phone]  Visit Protocol: General skin conditions  Patient Summary:  Gisella is a 40 year old ( : 1985 ) female who initiated a visit for evaluation of an unspecified skin condition.     Images of the skin condition were uploaded.  Images of the skin condition were not required due to its   location.  The symptoms started 1-3 days ago and affect the left side of the body. The skin condition is located on the groin. The color of the skin condition is red. The color in the affected area has not rapidly increased in size.   The affected area   has raised skin bumps, sores, and pimples. It feels warm to touch, tender to touch, and painful. The symptoms do not interfere with sleep.   Symptom details   Pain: The pain is severe (between 7-9 on a 10 point pain scale).   Since the skin condition   started, it has changed in size, color, and pain level.   Denied symptoms include scabs, bruise-like rash, hives, burning, crusts, itchiness, numbness, drainage, blisters, and dry/flaky skin. Gisella does not feel feverish. Gisella does not have a rash   that resembles a target.   Gisella has used over-the-counter oral medications (such as Ibuprofen, Tylenol, or Benadryl) to relieve symptoms. The OTC oral medication was not helpful.   Precipitating events   Gisella did not come in contact with any   irritants prior to the onset of the symptoms and has not been in close contact with anyone that has similar symptoms. Gisella also did not spend time in a wooded area, swim, travel, or spend excess time in the sun just before the symptoms started. Gisella   did not get bitten or stung by an insect.   Pertinent medical history  Gisella has experienced this skin condition before. The current skin condition does not come and go.  The last time experienced this skin condition was more than a year ago.   Reported   skin tone is Type 3: fair to medium natural skin.   Gisella has had chickenpox and has not had shingles in the past.    Gisella does not have a history or a family history of atopia. Gisella does not have diabetes. Ongoing medical conditions were denied.     Gisella does not get a yeast infection when taking antibiotics.   Gisella does not smoke or use smokeless tobacco. Gisella does not vape or use other e-cigarette products.   Gisella denies pregnancy and denies breastfeeding.   Additional information as   reported by the patient (free text): Thought it was an ingrown hair. It's gotten much larger, red and painful very quickly. Not getting better with ibuprofen and warm compress.   Weight: 150 lbs (68.04 kg)    MEDICATIONS: Zoloft oral, sertraline oral, lisdexamfetamine oral, prednisone oral, ondansetron oral, alprazolam oral, Wegovy subcutaneous, ALLERGIES: clindamycin  Clinician Response:  Dear Gisella,   Your health is our priority. To determine the most appropriate care for you, I would like you to be seen in person to further discuss your health history and symptoms.  You will not be charged for this visit. Thank you   for trusting us with your care.   Diagnosis: Refer for additional evaluation  Diagnosis ICD: R69

## 2025-07-27 ENCOUNTER — TELEMEDICINE (OUTPATIENT)
Dept: FAMILY MEDICINE CLINIC | Facility: TELEHEALTH | Age: 40
End: 2025-07-27
Payer: COMMERCIAL

## 2025-07-27 DIAGNOSIS — L02.91 ABSCESS: Primary | ICD-10-CM

## 2025-07-27 PROCEDURE — 99213 OFFICE O/P EST LOW 20 MIN: CPT | Performed by: NURSE PRACTITIONER

## 2025-07-27 RX ORDER — MUPIROCIN 2 %
1 OINTMENT (GRAM) TOPICAL 3 TIMES DAILY
Qty: 21 G | Refills: 0 | Status: SHIPPED | OUTPATIENT
Start: 2025-07-27 | End: 2025-08-03

## 2025-07-27 RX ORDER — DOXYCYCLINE 100 MG/1
100 CAPSULE ORAL 2 TIMES DAILY
Qty: 14 CAPSULE | Refills: 0 | Status: SHIPPED | OUTPATIENT
Start: 2025-07-27 | End: 2025-08-03

## 2025-07-28 NOTE — PROGRESS NOTES
You have chosen to receive care through a telehealth visit.  Do you consent to use a video/audio connection for your medical care today? Yes     CHIEF COMPLAINT  No chief complaint on file.        HPI  Gisella Mora is a 40 y.o. female  presents with complaint of   What started as an assumed ingrown hair has become extremely painful and red, warm to the touch. Ibuprofen and warm compress hasn’t helped much. Today I’ve had nausea and chills. Wondering if it’s an infection. Top of her pubic bone she has a sore. Last couple days red, painful and tender to touch. Warm and really swollen. Hallf dollar size. No drainage. Reports its on the left. Today she has been nausea and chills. Using warm compresses and IBU that has not helped much.     Review of Systems   Constitutional:  Positive for chills. Negative for fatigue and fever.   HENT:  Negative for congestion, ear discharge, ear pain, sinus pressure, sinus pain and sore throat.    Respiratory:  Negative for cough, chest tightness, shortness of breath and wheezing.    Cardiovascular:  Negative for chest pain.   Gastrointestinal:  Positive for nausea. Negative for abdominal pain, diarrhea and vomiting.   Musculoskeletal:  Negative for back pain and myalgias.   Skin:         Bump on her left pubic area   Neurological:  Negative for dizziness and headaches.   Psychiatric/Behavioral: Negative.         Past Medical History:   Diagnosis Date    ADHD (attention deficit hyperactivity disorder)     Anxiety     Binge eating     Depression     Numbness and tingling in hands     ONLY WITH SVT    Supraventricular tachycardia        Family History   Problem Relation Age of Onset    No Known Problems Mother     No Known Problems Father     Pancreatitis Maternal Grandmother     Breast cancer Paternal Grandmother     Colon cancer Paternal Grandmother        Social History     Socioeconomic History    Marital status:    Tobacco Use    Smoking status: Former     Current packs/day:  0.00     Average packs/day: 0.5 packs/day for 3.0 years (1.5 ttl pk-yrs)     Types: Cigarettes     Start date: 2005     Quit date: 2008     Years since quittin.5     Passive exposure: Past    Smokeless tobacco: Never   Vaping Use    Vaping status: Never Used   Substance and Sexual Activity    Alcohol use: Yes     Comment: Socially     Drug use: Never    Sexual activity: Not Currently     Birth control/protection: Abstinence       Gisella Mora  reports that she quit smoking about 17 years ago. Her smoking use included cigarettes. She started smoking about 20 years ago. She has a 1.5 pack-year smoking history. She has been exposed to tobacco smoke. She has never used smokeless tobacco. I have educated her on the risk of diseases from using tobacco products such as cancer, COPD, and heart disease.         I spent 1 minutes counseling the patient.              LMP 2025   Breastfeeding No     PHYSICAL EXAM  Physical Exam   Constitutional: She is oriented to person, place, and time. She appears well-developed and well-nourished. She does not have a sickly appearance. No distress.   HENT:   Head: Normocephalic and atraumatic.   Right Ear: Hearing normal.   Left Ear: Hearing normal.   Nose: No congestion.   Mouth/Throat: Mouth/Lips are normal.  Eyes: Conjunctivae and lids are normal.   Pulmonary/Chest: Effort normal.  No respiratory distress.  Neurological: She is alert and oriented to person, place, and time.   Skin:        Left pelvic area at the hairline with a half dollar sized abscess. Area is red and warm. No drainage from the area. Area is not firm. Denies fluctuance.    Psychiatric: She has a normal mood and affect. Her speech is normal and behavior is normal.           Diagnoses and all orders for this visit:    1. Abscess (Primary)    Other orders  -     mupirocin (BACTROBAN) 2 % ointment; Apply 1 Application topically to the appropriate area as directed 3 (Three) Times a Day for 7 days.   Dispense: 21 g; Refill: 0  -     doxycycline (VIBRAMYCIN) 100 MG capsule; Take 1 capsule by mouth 2 (Two) Times a Day for 7 days.  Dispense: 14 capsule; Refill: 0    Rest  Warm compresses   Get UTD updated   PCP if symptoms do not improve- discussed with patient area may need to be lanced and will not completely heal with antibiotics alone if area needs to be drained. Patient agrees to go to work clinic in the am to be seen in person. Will start antibiotics tonight.    ER for any worsening symptoms such as high fever, red streaking from area or nausea vomiting.       FOLLOW-UP  As discussed during visit with PCP/[facility] if no improvement or Urgent Care/Emergency Department if worsening of symptoms    Patient verbalizes understanding of medication dosage, comfort measures, instructions for treatment and follow-up.    Saskia Arizmendi, APRN  07/27/2025  20:42 EDT    Mode of Visit: Video  Location of patient: -HOME-  Location of provider: +HOME+  You have chosen to receive care through a telehealth visit.  The patient has signed the video visit consent form.  The visit included audio and video interaction. No technical issues occurred during this visit.      The use of a video visit has been reviewed with the patient and verbal informed consent has been obtained. Myself and Gisella Mora participated in this visit. The patient is located in [address] [place] [address].   I am located in [place], [place]. [employer] and [employer] were utilized. I spent 5 minutes in the patient's chart for this visit.         Note Disclaimer: At [facility], we believe that sharing information builds trust and better   relationships. You are receiving this note because you recently visited [facility]. It is possible you   will see health information before a provider has talked with you about it. This kind of information can   be easy to misunderstand. To help you fully understand what it means for your  health, we urge you to   discuss this note with your provider.

## 2025-08-18 DIAGNOSIS — R11.0 NAUSEA: ICD-10-CM

## 2025-08-19 RX ORDER — ONDANSETRON 4 MG/1
4 TABLET, ORALLY DISINTEGRATING ORAL EVERY 8 HOURS PRN
Qty: 20 TABLET | Refills: 1 | Status: SHIPPED | OUTPATIENT
Start: 2025-08-19

## 2025-08-27 ENCOUNTER — OFFICE VISIT (OUTPATIENT)
Dept: BEHAVIORAL HEALTH | Facility: CLINIC | Age: 40
End: 2025-08-27
Payer: COMMERCIAL

## 2025-08-27 VITALS
HEART RATE: 94 BPM | BODY MASS INDEX: 23.54 KG/M2 | HEIGHT: 67 IN | DIASTOLIC BLOOD PRESSURE: 73 MMHG | WEIGHT: 150 LBS | SYSTOLIC BLOOD PRESSURE: 98 MMHG

## 2025-08-27 DIAGNOSIS — F90.2 ADHD (ATTENTION DEFICIT HYPERACTIVITY DISORDER), COMBINED TYPE: ICD-10-CM

## 2025-08-27 RX ORDER — LISDEXAMFETAMINE DIMESYLATE 40 MG/1
40 CAPSULE ORAL EVERY MORNING
Qty: 10 CAPSULE | Refills: 0 | Status: SHIPPED | OUTPATIENT
Start: 2025-08-27

## (undated) DEVICE — CATH QUAD CRD 6F5MM

## (undated) DEVICE — Device: Brand: REFERENCE PATCH CARTO 3

## (undated) DEVICE — SET PRIMARY GRVTY 10DP MALE LL 104IN

## (undated) DEVICE — DRSNG SURESITE WNDW 2.38X2.75

## (undated) DEVICE — Device: Brand: MEDEX

## (undated) DEVICE — LIMB HOLDERS: Brand: DEROYAL

## (undated) DEVICE — ST INF PRI SMRTSTE 20DRP 2VLV 24ML 117

## (undated) DEVICE — ADULT, W/LG. BACK PAD, RADIOTRANSPARENT ELEMENT AND LEAD WIRE: Brand: DEFIBRILLATION ELECTRODES

## (undated) DEVICE — DECANT BG O JET

## (undated) DEVICE — INTRO SHEATH ENGAGE W/50 GW .038 7F12

## (undated) DEVICE — Device: Brand: EZ STEER NAV

## (undated) DEVICE — LEX ELECTRO PHYSIOLOGY: Brand: MEDLINE INDUSTRIES, INC.

## (undated) DEVICE — DRSNG SURESITE123 4X4.8IN

## (undated) DEVICE — Device: Brand: WEBSTER

## (undated) DEVICE — ST EXT IV SMARTSITE 2VLV SP M LL 5ML IV1

## (undated) DEVICE — DECANTER: Brand: UNBRANDED

## (undated) DEVICE — CANN NASL CO2 DIVIDED A/

## (undated) DEVICE — SOL NACL 0.9PCT 1000ML